# Patient Record
Sex: MALE | Race: WHITE | NOT HISPANIC OR LATINO | URBAN - METROPOLITAN AREA
[De-identification: names, ages, dates, MRNs, and addresses within clinical notes are randomized per-mention and may not be internally consistent; named-entity substitution may affect disease eponyms.]

---

## 2017-05-11 ENCOUNTER — HOSPITAL ENCOUNTER (OUTPATIENT)
Dept: RADIOLOGY | Facility: HOSPITAL | Age: 57
Discharge: HOME | End: 2017-05-11
Attending: ORTHOPAEDIC SURGERY
Payer: COMMERCIAL

## 2017-05-11 ENCOUNTER — ALLSCRIPTS OFFICE VISIT (OUTPATIENT)
Dept: OTHER | Facility: OTHER | Age: 57
End: 2017-05-11

## 2017-05-11 DIAGNOSIS — M25.562 PAIN IN LEFT KNEE: ICD-10-CM

## 2017-05-11 DIAGNOSIS — M17.11 PRIMARY OSTEOARTHRITIS OF RIGHT KNEE: ICD-10-CM

## 2017-05-11 DIAGNOSIS — M17.12 PRIMARY OSTEOARTHRITIS OF LEFT KNEE: ICD-10-CM

## 2017-05-11 DIAGNOSIS — M25.561 PAIN IN RIGHT KNEE: ICD-10-CM

## 2017-05-11 PROCEDURE — 73562 X-RAY EXAM OF KNEE 3: CPT

## 2017-06-05 ENCOUNTER — APPOINTMENT (OUTPATIENT)
Dept: PHYSICAL THERAPY | Facility: CLINIC | Age: 57
End: 2017-06-05
Payer: COMMERCIAL

## 2017-06-05 ENCOUNTER — GENERIC CONVERSION - ENCOUNTER (OUTPATIENT)
Dept: OTHER | Facility: OTHER | Age: 57
End: 2017-06-05

## 2017-06-05 PROCEDURE — 97110 THERAPEUTIC EXERCISES: CPT

## 2017-06-05 PROCEDURE — 97162 PT EVAL MOD COMPLEX 30 MIN: CPT

## 2017-06-19 ENCOUNTER — APPOINTMENT (OUTPATIENT)
Dept: PHYSICAL THERAPY | Facility: CLINIC | Age: 57
End: 2017-06-19
Payer: COMMERCIAL

## 2017-06-19 PROCEDURE — 97110 THERAPEUTIC EXERCISES: CPT

## 2017-06-19 PROCEDURE — 97140 MANUAL THERAPY 1/> REGIONS: CPT

## 2017-06-22 ENCOUNTER — ALLSCRIPTS OFFICE VISIT (OUTPATIENT)
Dept: OTHER | Facility: OTHER | Age: 57
End: 2017-06-22

## 2017-06-30 ENCOUNTER — ALLSCRIPTS OFFICE VISIT (OUTPATIENT)
Dept: OTHER | Facility: OTHER | Age: 57
End: 2017-06-30

## 2017-07-07 ENCOUNTER — ALLSCRIPTS OFFICE VISIT (OUTPATIENT)
Dept: OTHER | Facility: OTHER | Age: 57
End: 2017-07-07

## 2017-07-14 ENCOUNTER — ALLSCRIPTS OFFICE VISIT (OUTPATIENT)
Dept: OTHER | Facility: OTHER | Age: 57
End: 2017-07-14

## 2017-08-22 ENCOUNTER — GENERIC CONVERSION - ENCOUNTER (OUTPATIENT)
Dept: OTHER | Facility: OTHER | Age: 57
End: 2017-08-22

## 2017-11-16 ENCOUNTER — GENERIC CONVERSION - ENCOUNTER (OUTPATIENT)
Dept: OTHER | Facility: OTHER | Age: 57
End: 2017-11-16

## 2017-12-05 ENCOUNTER — GENERIC CONVERSION - ENCOUNTER (OUTPATIENT)
Dept: OTHER | Facility: OTHER | Age: 57
End: 2017-12-05

## 2017-12-12 ENCOUNTER — GENERIC CONVERSION - ENCOUNTER (OUTPATIENT)
Dept: OTHER | Facility: OTHER | Age: 57
End: 2017-12-12

## 2017-12-19 ENCOUNTER — GENERIC CONVERSION - ENCOUNTER (OUTPATIENT)
Dept: OTHER | Facility: OTHER | Age: 57
End: 2017-12-19

## 2018-01-12 VITALS
DIASTOLIC BLOOD PRESSURE: 90 MMHG | WEIGHT: 236 LBS | HEIGHT: 63 IN | HEART RATE: 71 BPM | SYSTOLIC BLOOD PRESSURE: 160 MMHG | BODY MASS INDEX: 41.82 KG/M2

## 2018-01-12 VITALS
SYSTOLIC BLOOD PRESSURE: 159 MMHG | HEART RATE: 74 BPM | WEIGHT: 236 LBS | DIASTOLIC BLOOD PRESSURE: 83 MMHG | BODY MASS INDEX: 41.81 KG/M2

## 2018-01-13 VITALS
BODY MASS INDEX: 41.81 KG/M2 | HEART RATE: 86 BPM | DIASTOLIC BLOOD PRESSURE: 94 MMHG | WEIGHT: 236 LBS | SYSTOLIC BLOOD PRESSURE: 159 MMHG

## 2018-01-15 VITALS
HEART RATE: 72 BPM | SYSTOLIC BLOOD PRESSURE: 172 MMHG | WEIGHT: 237 LBS | HEIGHT: 63 IN | DIASTOLIC BLOOD PRESSURE: 106 MMHG | BODY MASS INDEX: 41.99 KG/M2

## 2018-01-22 VITALS
SYSTOLIC BLOOD PRESSURE: 186 MMHG | BODY MASS INDEX: 40.93 KG/M2 | HEART RATE: 69 BPM | DIASTOLIC BLOOD PRESSURE: 103 MMHG | WEIGHT: 231 LBS | HEIGHT: 63 IN

## 2018-01-24 VITALS
HEART RATE: 74 BPM | DIASTOLIC BLOOD PRESSURE: 93 MMHG | BODY MASS INDEX: 40.4 KG/M2 | SYSTOLIC BLOOD PRESSURE: 188 MMHG | HEIGHT: 63 IN | WEIGHT: 228 LBS

## 2018-01-24 VITALS
HEIGHT: 63 IN | WEIGHT: 229 LBS | SYSTOLIC BLOOD PRESSURE: 176 MMHG | DIASTOLIC BLOOD PRESSURE: 95 MMHG | HEART RATE: 72 BPM | BODY MASS INDEX: 40.57 KG/M2

## 2018-01-24 VITALS
DIASTOLIC BLOOD PRESSURE: 83 MMHG | HEART RATE: 63 BPM | WEIGHT: 229.38 LBS | BODY MASS INDEX: 40.64 KG/M2 | SYSTOLIC BLOOD PRESSURE: 162 MMHG | HEIGHT: 63 IN

## 2023-11-13 ENCOUNTER — APPOINTMENT (OUTPATIENT)
Dept: RADIOLOGY | Age: 63
End: 2023-11-13
Payer: COMMERCIAL

## 2023-11-13 VITALS
HEIGHT: 63 IN | DIASTOLIC BLOOD PRESSURE: 85 MMHG | HEART RATE: 85 BPM | BODY MASS INDEX: 38.09 KG/M2 | SYSTOLIC BLOOD PRESSURE: 162 MMHG | WEIGHT: 215 LBS

## 2023-11-13 DIAGNOSIS — M17.11 PRIMARY OSTEOARTHRITIS OF RIGHT KNEE: ICD-10-CM

## 2023-11-13 DIAGNOSIS — M25.562 PAIN IN BOTH KNEES, UNSPECIFIED CHRONICITY: ICD-10-CM

## 2023-11-13 DIAGNOSIS — M25.561 PAIN IN BOTH KNEES, UNSPECIFIED CHRONICITY: ICD-10-CM

## 2023-11-13 DIAGNOSIS — M17.11 PRIMARY OSTEOARTHRITIS OF RIGHT KNEE: Primary | ICD-10-CM

## 2023-11-13 DIAGNOSIS — M17.12 PRIMARY OSTEOARTHRITIS OF LEFT KNEE: ICD-10-CM

## 2023-11-13 PROCEDURE — 73564 X-RAY EXAM KNEE 4 OR MORE: CPT

## 2023-11-13 PROCEDURE — 73562 X-RAY EXAM OF KNEE 3: CPT

## 2023-11-13 PROCEDURE — 99205 OFFICE O/P NEW HI 60 MIN: CPT | Performed by: STUDENT IN AN ORGANIZED HEALTH CARE EDUCATION/TRAINING PROGRAM

## 2023-11-13 RX ORDER — CELECOXIB 200 MG/1
200 CAPSULE ORAL 2 TIMES DAILY
Qty: 60 CAPSULE | Refills: 0 | Status: SHIPPED | OUTPATIENT
Start: 2023-11-13

## 2023-11-13 RX ORDER — CELECOXIB 200 MG/1
200 CAPSULE ORAL 2 TIMES DAILY
Qty: 60 CAPSULE | Refills: 1 | Status: SHIPPED | OUTPATIENT
Start: 2023-11-13 | End: 2023-11-13

## 2023-11-13 RX ORDER — ASCORBIC ACID 500 MG
500 TABLET ORAL 2 TIMES DAILY
Qty: 60 TABLET | Refills: 0 | Status: SHIPPED | OUTPATIENT
Start: 2023-11-13 | End: 2023-12-13

## 2023-11-13 RX ORDER — FOLIC ACID 1 MG/1
1 TABLET ORAL DAILY
Qty: 30 TABLET | Refills: 0 | Status: SHIPPED | OUTPATIENT
Start: 2023-11-13 | End: 2023-12-13

## 2023-11-13 RX ORDER — CELECOXIB 200 MG/1
200 CAPSULE ORAL 2 TIMES DAILY
Qty: 60 CAPSULE | Refills: 1 | Status: SHIPPED | OUTPATIENT
Start: 2023-11-13 | End: 2023-11-13 | Stop reason: SDUPTHER

## 2023-11-13 RX ORDER — HYDROCHLOROTHIAZIDE 25 MG/1
25 TABLET ORAL DAILY
COMMUNITY
Start: 2023-08-24

## 2023-11-13 RX ORDER — CHLORHEXIDINE GLUCONATE ORAL RINSE 1.2 MG/ML
15 SOLUTION DENTAL ONCE
OUTPATIENT
Start: 2023-11-13 | End: 2023-11-13

## 2023-11-13 RX ORDER — FERROUS SULFATE 324(65)MG
324 TABLET, DELAYED RELEASE (ENTERIC COATED) ORAL EVERY OTHER DAY
Qty: 15 TABLET | Refills: 0 | Status: SHIPPED | OUTPATIENT
Start: 2023-11-13 | End: 2023-12-13

## 2023-11-13 RX ORDER — CHLORHEXIDINE GLUCONATE 4 G/100ML
SOLUTION TOPICAL DAILY PRN
OUTPATIENT
Start: 2023-11-13

## 2023-11-13 RX ORDER — MULTIVIT-MIN/IRON FUM/FOLIC AC 7.5 MG-4
1 TABLET ORAL DAILY
Qty: 30 TABLET | Refills: 0 | Status: SHIPPED | OUTPATIENT
Start: 2023-11-13 | End: 2023-12-13

## 2023-11-13 RX ORDER — WARFARIN SODIUM 4 MG/1
TABLET ORAL
COMMUNITY

## 2023-11-13 RX ORDER — ACETAMINOPHEN 325 MG/1
975 TABLET ORAL ONCE
OUTPATIENT
Start: 2023-11-13 | End: 2023-11-13

## 2023-11-13 NOTE — ASSESSMENT & PLAN NOTE
Patient is a Middle-Aged (Approx 42-67yo) male with with pain at rest or at night  , functional instability, no mechanical symptoms  and addressed modifiable risk factors. Radiographic evaluation demonstrates severe degenerative changes in all compartments. Physical exam reveals fixed varus and full range of motion.  Given these findings, I recommend:     -Right total knee arthroplasty

## 2023-11-13 NOTE — TELEPHONE ENCOUNTER
Caller: Pharmacy     Doctor: Dg Mendoza     Reason for call: States there is an interaction with Celebrex and warfarin     Call back#:   [facility] DR JONO ZHOU [facility] [address] [facility], [address] [facility] [address]  [address] [facility], [address] [place] [address]  Phone: [phone]  Fax: [phone]  RUSSELL #: --

## 2023-11-13 NOTE — PROGRESS NOTES
Date: 23  Lynn Mercado. MRN# [id number]  : 1960      Chief Complaint: Bilateral knee pain, right greater than left    Assessment and Plan:    Primary osteoarthritis of right knee  Patient is a Middle-Aged (Approx 42-67yo) male with with pain at rest or at night  , functional instability, no mechanical symptoms  and addressed modifiable risk factors. Radiographic evaluation demonstrates severe degenerative changes in all compartments. Physical exam reveals fixed varus and full range of motion. Given these findings, I recommend:     -Right total knee arthroplasty    Primary osteoarthritis of left knee  While patient's left knee osteoarthritis is radiographically more advanced, the right side is more symptomatic. He would like to proceed with right-sided total knee arthroplasty at this time    -WBAT  -Activity modification to limit strain or impact on the joint  -celecoxib (Celebrex) as needed. Patient cautioned on the interaction with his warfarin  -Tylenol as needed. Do not exceed 3000mg daily  -Supervised physical therapy. Script provided   -Home exercise program directed by PT  -Weight loss discussed   -Knee sleeve or brace for comfort  -Patient will likely schedule total knee arthroplasty later in the year     TOTAL KNEE REPLACEMENT INDICATIONS AND RISKS    We had a lengthy discussion with the patient regarding the potential options for treatment. The patient has had an extensive conservative management course up until this time and therefore I do not feel that any additional conservative management will provide additional relief. The patient's symptoms have progressively worsened to the point where they now limit the patient's daily activities and quality of life. At this time, I feel that this patient would be an excellent candidate for a total knee replacement. The patient has failed non-operative care and continues to have unacceptable symptoms.  We discussed the treatment options and alternatives and the risks and benefits of surgery in great detail. While no guarantees can be made, total knee replacement has a very high success rate in terms of relieving a patient's knee pain and returning them to a more active, independent lifestyle for 10-15 years or more. All surgery carries some risk; for knee replacement, the complication rate is low but may include: death (very rare), infection, bleeding requiring transfusion, blood clots in legs traveling to lungs, nerve and/or blood vessel damage, bone fracture, persistent knee pain and/or stiffness, and repeat surgery(ies). The risk of a major complication is about 1-2 per 1000 cases. Total knee replacement should only be done if conservative treatment has failed. The revision rate for total knee replacements is about 1-2% per year; in other words, 85-95% of knee replacements may last 10 years; 75-85% last 20 years; and so on, assuming no injury to the knee. Finally we discussed anesthesia related complications which will be discussed in greater detail with the anesthesia team before surgery. The patient was shown total knee booklets, diagrams and/or models and all of their questions have been answered at the present time. The patient may call or come in if they have any other concerns or questions. The patient also understands the post-operative rehabilitation process and the need for their cooperation and participation, and that their results may be compromised by their lack of compliance. The patient would like to proceed. Patient is encouraged to seek additional opinions if they so desire. The patient voiced their understanding of the surgical plan and potential complications and wishes to proceed with surgery. Subjective:     Knee Pain  Patient complains of bilateral knee pain, right greater than left. This is evaluated as a personal injury. The pain began several years ago.  The pain is located global.  He describes the symptoms as aching, shooting, and throbbing. Symptoms improve with rest, avoiding painful activities. The symptoms are worse with deep knee bending, sleeping, sitting for prolonged periods of time. The knee has given out or felt unstable. The patient can bend and straighten the knee fully. Treatment to date has been ice, heat, Tylenol, NSAID's, knee sleeve/brace, cortisone injection, therapy, without significant relief. Of note, patient was seen in 2017 by Dr. Gm Cerrato who indicated the patient for total knee arthroplasty at that time. Patient wanted to defer surgical management until the knee pain no longer responded to less aggressive treatment modalities. External Records Reviewed: office notes and radiology reports    Allergy:  Allergies   Allergen Reactions    Other Myalgia     Medications:  all current active meds have been reviewed  Past Medical History:  Past Medical History:   Diagnosis Date    Hypertension      Past Surgical History:  Past Surgical History:   Procedure Laterality Date    FOOT SURGERY Right     KNEE SURGERY Right 2009    SHOULDER SURGERY       Family History:  Family History   Problem Relation Age of Onset    No Known Problems Mother     No Known Problems Father      Social History:  Social History     Substance and Sexual Activity   Alcohol Use None     Social History     Substance and Sexual Activity   Drug Use Not on file     Social History     Tobacco Use   Smoking Status Former    Types: Cigarettes    Quit date:     Years since quittin.8    Passive exposure: Never   Smokeless Tobacco Never           ROS:   Review of Systems   All other systems reviewed and are negative.        Objective:   BP Readings from Last 1 Encounters:   23 162/85      Wt Readings from Last 1 Encounters:   23 97.5 kg (215 lb)      Pulse Readings from Last 1 Encounters:   23 85      BMI: Estimated body mass index is 38.09 kg/m² as calculated from the following:    Height as of this encounter: 5' 3" (1.6 m). Weight as of this encounter: 97.5 kg (215 lb). Physical Exam  Constitutional:       General: He is not in acute distress. HENT:      Head: Normocephalic and atraumatic. Eyes:      Conjunctiva/sclera: Conjunctivae normal.   Cardiovascular:      Comments: Extremities well perfused   No LE edema    Pulmonary:      Effort: Pulmonary effort is normal.   Abdominal:      General: Abdomen is flat. Bowel sounds are normal.   Neurological:      Mental Status: He is alert. Mental status is at baseline. Gait and Station:   antalgic    Bilateral Lower Extremity:  Left Knee: Inspection:  normal color, temperature, turgor and moisture    Overall limb alignment: varus    Effusion: minimal    ROM 0 to 140 with pain    Extensor Lag: Absent    Palpation: Medial and Lateral joint line tenderness to palpation    stable to AP translation at 90 deg    Coronal plane stable in full extension    Coronal plane stable in mid-flexion     Motor: 5/5 EHL/FHL/TA/GS/Qd/Hs    Vascular: Toes WWP with BCR    Sensory: SILT DP/SP/Meli/Saph/Tib    Right knee: Inspection:  normal color, temperature, turgor and moisture    Overall limb alignment: varus    Effusion: minimal    ROM 0 to 140 with pain    Extensor Lag: Absent    Palpation: Medial and Lateral joint line tenderness to palpation    stable to AP translation at 90 deg    Coronal plane stable in full extension    Coronal plane stable in mid-flexion     Motor: 5/5 EHL/FHL/TA/GS/Qd/Hs    Vascular:  Toes WWP with BCR    Sensory: SILT DP/SP/Meli/Saph/Tib    Images:    I personally reviewed relevant images in the PACS system and my interpretation is as follows:  X-rays of the right knee reveal severe degenerative changes with subchondral sclerosis, joint space narrowing, and osteophyte formation  X-rays of the left knee reveal severe degenerative changes with subchondral cysts, subchondral sclerosis, joint space narrowing, and osteophyte formation        Lalita Jerez MD  Adult Reconstruction Specialist   [address] [facility]

## 2023-11-13 NOTE — ASSESSMENT & PLAN NOTE
While patient's left knee osteoarthritis is radiographically more advanced, the right side is more symptomatic. He would like to proceed with right-sided total knee arthroplasty at this time    -WBAT  -Activity modification to limit strain or impact on the joint  -celecoxib (Celebrex) as needed. Patient cautioned on the interaction with his warfarin  -Tylenol as needed. Do not exceed 3000mg daily  -Supervised physical therapy.  Script provided   -Home exercise program directed by PT  -Weight loss discussed   -Knee sleeve or brace for comfort  -Patient will likely schedule total knee arthroplasty later in the year

## 2023-11-16 ENCOUNTER — TELEPHONE (OUTPATIENT)
Dept: OBGYN CLINIC | Facility: CLINIC | Age: 63
End: 2023-11-16

## 2023-11-16 NOTE — TELEPHONE ENCOUNTER
Spoke with patient regarding changing date/location of upcoming surgery. Patient agreed to moving surgery to 1/10/24 at [address] [place] [address]. Appointments updated.

## 2023-11-30 ENCOUNTER — TELEPHONE (OUTPATIENT)
Dept: OBGYN CLINIC | Facility: CLINIC | Age: 63
End: 2023-11-30

## 2023-11-30 ENCOUNTER — TELEPHONE (OUTPATIENT)
Age: 63
End: 2023-11-30

## 2023-11-30 NOTE — TELEPHONE ENCOUNTER
Called and spoke with patient regarding changing upcoming surgery date. Moved it from 1/10/24 to 1/12/24 at [address] [place] [address]. Patient in agreement. Related appointments updated.

## 2023-12-05 ENCOUNTER — TELEPHONE (OUTPATIENT)
Dept: OBGYN CLINIC | Facility: HOSPITAL | Age: 63
End: 2023-12-05

## 2023-12-05 NOTE — TELEPHONE ENCOUNTER
Preoperative Elective Admission Assessment    Living Situation:    Who does pt live with: lives alone  What kind of home: apartment  How do they enter the home: front  How many levels in home: 1   # of steps to enter home: 4  # of steps to second floor: n/a  Are there handrails: Yes  Are there landings: No  Sleeping arrangement: first/entry floor  Where is Bathroom: entry level  Where is the tub or shower: Step in bath tub with grab bar, denies shower chair   Dogs or ther pets: n/a     First Floor Setup:   Is there a bathroom: Yes  Where would pt sleep: bed     DME: grab bars, rolling walker, and cane  We discussed clearing pathways in the home and making sure there is accessibly to use the walker, for example, removing throw rugs. Patient's Current Level of Function: Ambulates: Independently and ADLs: Independent    Post-op Caregiver: friend  Caregiver Name and phone number for Inpatient discharge needs: Estrella Hicks  Currently receive any HHC/aides/community supports: No     Post-op Transport: friend  To/from hospital: friend  To/from PT 2-3x/week: friend  Uses community transport now: No     Outpatient Physical Therapy Site:  Site: [facility] patient  pre and post-op appts scheduled? No     Medication Management: self and out of bottle  Preferred Pharmacy for Post-op Medications: [facility]  Blood Management Vitamin Regimen:  starting in 2 weeks  Post-op anticoagulant: to be determined by surgical team postoperatively     DC Plan: Pt plans to be discharged home      Barriers to DC identified preoperatively: none identified    BMI: 38.09    Patient Education:  Pt educated on post-op pain, early mobilization (POD0), LOS goals, OP PT goals, and preoperative bathing. Patient educated that our goal is to appropriately discharge patient based off their post-op function while striving to maintain maximal independence.  The goal is to discharge patient to home and for them to attend outpatient physical therapy.     Assigned to care team? Yes

## 2023-12-16 ENCOUNTER — APPOINTMENT (OUTPATIENT)
Dept: LAB | Facility: HOSPITAL | Age: 63
End: 2023-12-16
Payer: COMMERCIAL

## 2023-12-16 ENCOUNTER — LAB REQUISITION (OUTPATIENT)
Dept: LAB | Facility: HOSPITAL | Age: 63
End: 2023-12-16
Payer: COMMERCIAL

## 2023-12-16 DIAGNOSIS — Z01.818 ENCOUNTER FOR PREADMISSION TESTING: ICD-10-CM

## 2023-12-16 DIAGNOSIS — M17.11 PRIMARY OSTEOARTHRITIS OF RIGHT KNEE: ICD-10-CM

## 2023-12-16 DIAGNOSIS — Z01.818 ENCOUNTER FOR OTHER PREPROCEDURAL EXAMINATION: ICD-10-CM

## 2023-12-16 LAB
ABO GROUP BLD: NORMAL
ALBUMIN SERPL BCP-MCNC: 4 G/DL (ref 3.5–5)
ALP SERPL-CCNC: 55 U/L (ref 34–104)
ALT SERPL W P-5'-P-CCNC: 27 U/L (ref 7–52)
ANION GAP SERPL CALCULATED.3IONS-SCNC: 5 MMOL/L
APTT PPP: 34 SECONDS (ref 23–37)
AST SERPL W P-5'-P-CCNC: 24 U/L (ref 13–39)
BASOPHILS # BLD AUTO: 0.05 THOUSANDS/ÂΜL (ref 0–0.1)
BASOPHILS NFR BLD AUTO: 1 % (ref 0–1)
BILIRUB SERPL-MCNC: 0.96 MG/DL (ref 0.2–1)
BLD GP AB SCN SERPL QL: NEGATIVE
BUN SERPL-MCNC: 31 MG/DL (ref 5–25)
CALCIUM SERPL-MCNC: 9.8 MG/DL (ref 8.4–10.2)
CHLORIDE SERPL-SCNC: 102 MMOL/L (ref 96–108)
CO2 SERPL-SCNC: 31 MMOL/L (ref 21–32)
CREAT SERPL-MCNC: 1.04 MG/DL (ref 0.6–1.3)
EOSINOPHIL # BLD AUTO: 0.44 THOUSAND/ÂΜL (ref 0–0.61)
EOSINOPHIL NFR BLD AUTO: 5 % (ref 0–6)
ERYTHROCYTE [DISTWIDTH] IN BLOOD BY AUTOMATED COUNT: 14.7 % (ref 11.6–15.1)
EST. AVERAGE GLUCOSE BLD GHB EST-MCNC: 131 MG/DL
GFR SERPL CREATININE-BSD FRML MDRD: 76 ML/MIN/1.73SQ M
GLUCOSE SERPL-MCNC: 100 MG/DL (ref 65–140)
HBA1C MFR BLD: 6.2 %
HCT VFR BLD AUTO: 46 % (ref 36.5–49.3)
HGB BLD-MCNC: 14.7 G/DL (ref 12–17)
IMM GRANULOCYTES # BLD AUTO: 0.03 THOUSAND/UL (ref 0–0.2)
IMM GRANULOCYTES NFR BLD AUTO: 0 % (ref 0–2)
INR PPP: 2.61 (ref 0.84–1.19)
LYMPHOCYTES # BLD AUTO: 2.56 THOUSANDS/ÂΜL (ref 0.6–4.47)
LYMPHOCYTES NFR BLD AUTO: 29 % (ref 14–44)
MCH RBC QN AUTO: 28.3 PG (ref 26.8–34.3)
MCHC RBC AUTO-ENTMCNC: 32 G/DL (ref 31.4–37.4)
MCV RBC AUTO: 89 FL (ref 82–98)
MONOCYTES # BLD AUTO: 0.74 THOUSAND/ÂΜL (ref 0.17–1.22)
MONOCYTES NFR BLD AUTO: 8 % (ref 4–12)
NEUTROPHILS # BLD AUTO: 5.05 THOUSANDS/ÂΜL (ref 1.85–7.62)
NEUTS SEG NFR BLD AUTO: 57 % (ref 43–75)
NRBC BLD AUTO-RTO: 0 /100 WBCS
PLATELET # BLD AUTO: 215 THOUSANDS/UL (ref 149–390)
PMV BLD AUTO: 11.6 FL (ref 8.9–12.7)
POTASSIUM SERPL-SCNC: 4.5 MMOL/L (ref 3.5–5.3)
PROT SERPL-MCNC: 7.2 G/DL (ref 6.4–8.4)
PROTHROMBIN TIME: 28.4 SECONDS (ref 11.6–14.5)
RBC # BLD AUTO: 5.19 MILLION/UL (ref 3.88–5.62)
RH BLD: POSITIVE
SODIUM SERPL-SCNC: 138 MMOL/L (ref 135–147)
SPECIMEN EXPIRATION DATE: NORMAL
WBC # BLD AUTO: 8.87 THOUSAND/UL (ref 4.31–10.16)

## 2023-12-16 PROCEDURE — 86850 RBC ANTIBODY SCREEN: CPT | Performed by: STUDENT IN AN ORGANIZED HEALTH CARE EDUCATION/TRAINING PROGRAM

## 2023-12-16 PROCEDURE — 85730 THROMBOPLASTIN TIME PARTIAL: CPT

## 2023-12-16 PROCEDURE — 85610 PROTHROMBIN TIME: CPT

## 2023-12-16 PROCEDURE — 36415 COLL VENOUS BLD VENIPUNCTURE: CPT

## 2023-12-16 PROCEDURE — 83036 HEMOGLOBIN GLYCOSYLATED A1C: CPT

## 2023-12-16 PROCEDURE — 80053 COMPREHEN METABOLIC PANEL: CPT

## 2023-12-16 PROCEDURE — 86900 BLOOD TYPING SEROLOGIC ABO: CPT | Performed by: STUDENT IN AN ORGANIZED HEALTH CARE EDUCATION/TRAINING PROGRAM

## 2023-12-16 PROCEDURE — 85025 COMPLETE CBC W/AUTO DIFF WBC: CPT

## 2023-12-16 PROCEDURE — 86901 BLOOD TYPING SEROLOGIC RH(D): CPT | Performed by: STUDENT IN AN ORGANIZED HEALTH CARE EDUCATION/TRAINING PROGRAM

## 2023-12-19 ENCOUNTER — TELEPHONE (OUTPATIENT)
Age: 63
End: 2023-12-19

## 2023-12-19 ENCOUNTER — TELEPHONE (OUTPATIENT)
Dept: OBGYN CLINIC | Facility: MEDICAL CENTER | Age: 63
End: 2023-12-19

## 2023-12-19 NOTE — TELEPHONE ENCOUNTER
Caller: Tru     Doctor: Dr Wood     Reason for call: The patient calling to update :    Workers Comp information per the patient  Name :   [employer]    [address]   [place] [address]     Policy # [id number]  Account Number : [id number]    Justin ([phone])     Call back#: [phone]

## 2023-12-19 NOTE — TELEPHONE ENCOUNTER
Caller: Patient    Doctor: Nina    Reason for call: Patient calling back with DOI for WC Claim.  Added information from previous message to the appropriate screens in the billing tab under WC.  Patient will be calling back with the  Ins carrier address and phone number.      Call back#: n/a

## 2023-12-19 NOTE — TELEPHONE ENCOUNTER
Caller: Patient    Doctor: Nina    Reason for call: patient states their case is going to be going through work comp-including upcoming surgery.    Work Comp information:  [facility] [employer]  Claim# [id number]  -[employer] ([phone])  States there is not a DOI due to it happening over the time of being at the employment. Patient is going to be calling back with some dates that their employer gave work comp.    Please send info to surg .    Call back#: [phone]

## 2023-12-20 ENCOUNTER — ANESTHESIA EVENT (OUTPATIENT)
Dept: PERIOP | Facility: HOSPITAL | Age: 63
End: 2023-12-20
Payer: OTHER MISCELLANEOUS

## 2023-12-20 NOTE — PRE-PROCEDURE INSTRUCTIONS
Pre-Surgery Instructions:   Medication Instructions    ascorbic acid (VITAMIN C) 500 MG tablet Hold day of surgery.    celecoxib (CeleBREX) 200 mg capsule Stop taking 3 days prior to surgery.    ferrous sulfate 324 (65 Fe) mg Hold day of surgery.    folic acid (FOLVITE) 1 mg tablet Hold day of surgery.    hydrochlorothiazide (HYDRODIURIL) 25 mg tablet Hold day of surgery.    Multiple Vitamins-Minerals (multivitamin with minerals) tablet Hold day of surgery.    warfarin (COUMADIN) 4 mg tablet Instructions provided by MD   Medication instructions for day surgery reviewed. Please use only a sip of water to take your instructed medications. Avoid all over the counter vitamins, supplements and NSAIDS for one week prior to surgery per anesthesia guidelines. Tylenol is ok to take as needed. Pt has CC on 12/22 and will ask cards for coumadin hold instructions.      You will receive a call one business day prior to surgery with an arrival time and hospital directions. If your surgery is scheduled on a Monday, the hospital will be calling you on the Friday prior to your surgery. If you have not heard from anyone by 8pm, please call the hospital supervisor through the hospital  at [phone]. (Aleksandr [phone]).    Do not eat or drink anything after midnight the night before your surgery, including candy, mints, lifesavers, or chewing gum. Do not drink alcohol 24hrs before your surgery. Try not to smoke at least 24hrs before your surgery.       Follow the pre surgery showering instructions as listed in the “My Surgical Experience Booklet” or otherwise provided by your surgeon's office. Do not use a blade to shave the surgical area 1 week before surgery. It is okay to use a clean electric clippers up to 24 hours before surgery. Do not apply any lotions, creams, including makeup, cologne, deodorant, or perfumes after showering on the day of your surgery. Do not use dry shampoo, hair spray, hair gel, or any type of  hair products.     No contact lenses, eye make-up, or artificial eyelashes. Remove nail polish, including gel polish, and any artificial, gel, or acrylic nails if possible. Remove all jewelry including rings and body piercing jewelry.     Wear causal clothing that is easy to take on and off. Consider your type of surgery.    Keep any valuables, jewelry, piercings at home. Please bring any specially ordered equipment (sling, braces) if indicated.    Arrange for a responsible person to drive you to and from the hospital on the day of your surgery. Visitor Guidelines discussed.     Call the surgeon's office with any new illnesses, exposures, or additional questions prior to surgery.    Please reference your “My Surgical Experience Booklet” for additional information to prepare for your upcoming surgery.

## 2023-12-26 PROBLEM — R73.03 PREDIABETES: Status: ACTIVE | Noted: 2021-12-13

## 2023-12-26 NOTE — H&P (VIEW-ONLY)
Internal Medicine Pre-Operative Evaluation:     Reason for Visit: Pre-operative Evaluation for Risk Stratification and Optimization    Patient ID: Tru Sy Jr. is a 63 y.o. male.     Surgery: Arthroplasty of right knee  Referring Provider: Dr. Wood      Recommendations to Proceed withSurgery    Patient is considered to be Low risk for Medium risk procedure.     After evaluation and discussion with patient with emphasis that all surgery has some degree of inherent risk it is determined this procedure is of acceptable risk  medically.    Patient may proceed with planned procedure with the specified coumadin instructions by his cardiologist. I spoke directly with Lynnette at his coumadin office and she will place the order for the post operative lovenox x 5 days to his local pharmacy and she will also instruct him when to have his INR checked post operatively.      Assessment    Pre-operative Medical Evaluation for planned surgery  Recommendations as listed in PLAN section below  Contact surgical nurse  navigator with any questions regarding preoperative plan or schedule.      Problem List Items Addressed This Visit          Cardiovascular and Mediastinum    Benign essential hypertension (Chronic)     Stable  Cont current medications as directed  Monitor post operative BP   Hold hctz the morning of surgery             Pulmonary embolism (HCC) (Chronic)     Takes warfarin for same  Had PE on 2 separate occasions at first with diagnosis  Then he attempted to come off AC and had a repeat            Musculoskeletal and Integument    Primary osteoarthritis of right knee     Failed outpatient conservative measures  Electing to undergo arthroplasty              Hematopoietic and Hemostatic    Factor V Leiden (HCC) (Chronic)     With h/o PE/DVT  Cardiology manages coumadin  They recommend holding coumadin x 2 days preoperatively and resuming POD#0 with bridge with low molecular weight heparin or lovenox until  INR is above 2  Would recommend a 5 day prescription for lovenox post operatively  **It should be noted that he has had surgeries in the past and has not required bridging post operatively and has not had issues.   Pt is agreeable, he will recheck his INR POD #3  Pt takes coumadin 7.5mg daily and has remained stable on this current dose  He was cleared by cardiology 12/22/23            Other    Obesity (Chronic)     Recommend ongoing attempts at weight loss  Current BMI meets criteria of <40 per MLJ preoperative qualifications           Prediabetes     Hgb A1c 6.2  Recommend following DM diet  Monitor FBS            Other Visit Diagnoses       Preoperative clearance    -  Primary                 Plan:     1. Further preoperative workup as follows:   - none no further testing required may proceed with surgery    2. Medication Management/Recommendations:   - hold ACE / ARB morning of surgery unless given other instructions by your provider  - hold aspirin 7 days prior to surgery  - Warfarin management: Discontinue warfarin 2 days before surgery and resume POD#0  - avoid use of NSAID such as motrin, advil, aleve for 7 days prior to surgery  - hold all OTC herbal or nutritional supplements 7 days before surgery    3. Patient requires further consultation with:   No Consults Required    4. Discharge Planning / Barriers to Discharge  none identified - patients has post discharge therapy plan in place, transportation arranged for discharge day, adequate family support at home to assist with discharge to home.        Subjective:           History of Present Illness:     Tru Sy JrDarell is a 63 y.o. male who presents to the office today for a preoperative consultation at the request of surgeon. The patient understands this is an elective procedure and not emergent. They are electing to undergo planned procedure with an understanding that all surgery has inherent risk. They have worked with their surgeon and failed  "conservative treatment measures. Today they present for preoperative risk assessment and recommendations for optimization in preparation for surgery.    Pt seen in surgical optimization center for upcoming proposed surgery. They have failed previous conservative measures and have elected surgical intervention.     Pt meets presurgical lab and BMI optimization goals.    Upon interview questioning patient is able to perform greater than 4 METs workload in daily life without any reported cardio-pulmonary symptoms. Pt was cleared by cardiology 12/22/2023 by Dr. Ornelas. He does recommend that the patient hold his coumadin for 2 days and bridge post surgery with Sq lovenox until INR is >2.0. I did discuss this with the patient and he is agreeable. We decided a 5 day prescription would be ideal. He will go get his INR checked on POD#3. It should be noted however that he has had to stop his coumadin in the past for other surgeries and has not required bridging and has done well.         ROS: No TIA's or unusual headaches, no dysphagia.  No prolonged cough. No dyspnea or chest pain on exertion.  No abdominal pain, change in bowel habits, black or bloody stools.  No urinary tract or BPH symptoms.  Positive reported pain in arthritic joint. Positive difficulty with gait. No skin rashes or issues.      Objective:    /80   Ht 5' 3\" (1.6 m)   Wt 99.9 kg (220 lb 4.8 oz)   BMI 39.02 kg/m²       General Appearance: no distress, conversive  HEENT: PERRLA, conjuctiva normal; oropharynx clear; mucous membranes moist;   Neck:  Supple, no lymphadenopathy or thyromegaly  Lungs: breath sounds normal, normal respiratory effort, no retractions, expiratory effort normal  CV: normal heart sounds S1/S2, PMI normal   ABD: soft non tender, +obese +BS x4  EXT: DP pulses intact, no lymphadenopathy, no edema  Skin: normal turgor, normal texture, no rash  Psych: affect normal, mood normal  Neuro: AAOx3        The following portions of the " "patient's history were reviewed and updated as appropriate: allergies, current medications, past family history, past medical history, past social history, past surgical history and problem list.     Past History:       Past Medical History:   Diagnosis Date    Factor 5 Leiden mutation, heterozygous (HCC)     H/O blood clots     pulmonary embolism    Hypertension     PONV (postoperative nausea and vomiting)     Past Surgical History:   Procedure Laterality Date    FOOT SURGERY Right     KNEE SURGERY Right 2009    SHOULDER SURGERY Bilateral           Social History     Tobacco Use    Smoking status: Former     Current packs/day: 0.00     Types: Cigarettes     Quit date:      Years since quittin.0     Passive exposure: Never    Smokeless tobacco: Never   Vaping Use    Vaping status: Never Used   Substance Use Topics    Alcohol use: Not Currently    Drug use: Never     Family History   Problem Relation Age of Onset    No Known Problems Mother     No Known Problems Father           Allergies:     Allergies   Allergen Reactions    Statins Myalgia    Other Myalgia        Current Medications:     Current Outpatient Medications   Medication Instructions    ascorbic acid (VITAMIN C) 500 mg, Oral, 2 times daily    celecoxib (CELEBREX) 200 mg, Oral, 2 times daily    ferrous sulfate 324 mg, Oral, Every other day    folic acid (FOLVITE) 1 mg, Oral, Daily    hydrochlorothiazide (HYDRODIURIL) 25 mg, Oral, Daily    Multiple Vitamins-Minerals (multivitamin with minerals) tablet 1 tablet, Oral, Daily    warfarin (COUMADIN) 4 mg tablet Oral, 7.5mg 3xweek, 5mg 4xweek           PRE-OP WORKSHEET DATA    Assessment of Pre-Operative Risks     MLJ Quality Hard Stops:  BMI (<40) : Estimated body mass index is 39.02 kg/m² as calculated from the following:    Height as of this encounter: 5' 3\" (1.6 m).    Weight as of this encounter: 99.9 kg (220 lb 4.8 oz).    Hgb ( >11):   Lab Results   Component Value Date    HGB 14.7 2023 "     HbA1c (<7.5) :   Lab Results   Component Value Date    HGBA1C 6.2 (H) 12/16/2023     GFR (>60) (Less then 45 = Nephrology consult):  CrCl cannot be calculated (Patient's most recent lab result is older than the maximum 7 days allowed.).      Active Decompensated Chronic Conditions which would delay surgery  No acutely decompensated medical issues such as recent CVA, MI, new onset arrhythmia, severe aortic stenosis, CHF, uncontrolled COPD       Exercise Capacity: (if less the 4 mets consider functional assessment via cardiac stress testing or consultation)    Able to walk 2 blocks without symptoms?: Yes  Able to walk 1 flights without symptoms?: Yes    Assessment of intra and post operative respiratory, hemodynamic and thrombotic risks     Prior Anesthesia Reactions: No     Personal history of venous thromboembolic disease? Yes, has factor V    History of steroid use > 5 mg for >2 weeks within last year? No      The patient's risk factors for cardiac complications include :  none    Tru Sy JrDarell has an IN HOSPITAL cardiac risk of RCI RISK CLASS I (0 risk factors, risk of major cardiac compl. appr. 0.5%) based on RCRI calculator    Cardiac Risk Estimation: per the Revised Cardiac Risk Index (Circ. 100:1043, 1999),        Pre-Op Data Reviewed:       Laboratory Results: I have personally reviewed the pertinent laboratory results/reports     EKG:I have personally reviewed pertinent reports.  . I personally reviewed and interpreted available tracings in the electronic medical record    POCT=12/22/23 NSR w/RBBB and PAC's    OLD RECORDS: reviewed old records in the chart review section if EHR on day of visit.    Previous cardiopulmonary studies within the past year:  Echocardiogram: no  Cardiac Catheterization: no  Stress Test: no      Time of visit including pre-visit chart review, visit and post-visit coordination of plan and care , review of pre-surgical lab work, preparation and time spent documenting note in  electronic medical record, time spent face-to-face in physical examination answering patient questions by care team 45 minutes             [facility]

## 2023-12-26 NOTE — ASSESSMENT & PLAN NOTE
Stable  Cont current medications as directed  Monitor post operative BP   Hold hctz the morning of surgery

## 2023-12-26 NOTE — PATIENT INSTRUCTIONS
Contact surgical nurse  navigator with any questions regarding preoperative plan or schedule.  Stop all over the counter supplements, herbal, naturopathic  medications for 1 week prior to surgery UNLESS prescribed by your surgeon  Hold NSAIDS (i.e. advil, alleve, motrin, ibuprofen, celebrex) minimum 7 days prior to surgery  Hold Asprin minimum 7 days prior to surgery  Recommend using Tylenol ( acetaminophen ) 500mg every eight hours during the first week post discharge in conjunction with any additional pain medicine prescribed by your surgeon  Use bowel medicines prescribed by your surgeon ( colace) daily post op during the first 1-2 weeks to avoid post operative constipation issues  Call [phone] with any post discharge concerns or medical issues  The morning of surgery take only the following medication with small sip of water: none  Hold hctz the morning of surgery  Hold warfarin x2 days a/t cardiology and bridge back to therapeutic INR with lovenox until INR is therapeutic

## 2023-12-26 NOTE — ASSESSMENT & PLAN NOTE
With h/o PE/DVT  Cardiology manages coumadin  They recommend holding coumadin x 2 days preoperatively and resuming POD#0 with bridge with low molecular weight heparin or lovenox until INR is above 2  Pt takes coumadin 7.5mg daily and has remained stable on this current dose  He was cleared by cardiology 12/22/23

## 2023-12-27 ENCOUNTER — TELEPHONE (OUTPATIENT)
Age: 63
End: 2023-12-27

## 2023-12-27 NOTE — TELEPHONE ENCOUNTER
Caller: [facility] Lynnette     Doctor: Jose    Reason for call: Patient told them that he only needs top hold his coumadin for 2 days and then he will bridging that with the Lovenox. Patient also told them that he would be released to go home after procedure and he would not be going to a rehab facility. They wanted to make sure that this was correct and that the patient will receive the lovenox rx from us before he leaved.     Call back#: [phone]

## 2023-12-28 ENCOUNTER — OFFICE VISIT (OUTPATIENT)
Age: 63
End: 2023-12-28
Payer: OTHER MISCELLANEOUS

## 2023-12-28 ENCOUNTER — TELEPHONE (OUTPATIENT)
Dept: OBGYN CLINIC | Facility: CLINIC | Age: 63
End: 2023-12-28

## 2023-12-28 VITALS
DIASTOLIC BLOOD PRESSURE: 80 MMHG | WEIGHT: 220.3 LBS | SYSTOLIC BLOOD PRESSURE: 148 MMHG | BODY MASS INDEX: 39.04 KG/M2 | HEIGHT: 63 IN

## 2023-12-28 DIAGNOSIS — Z01.818 PREOPERATIVE CLEARANCE: Primary | ICD-10-CM

## 2023-12-28 DIAGNOSIS — I26.99 PULMONARY EMBOLISM, UNSPECIFIED CHRONICITY, UNSPECIFIED PULMONARY EMBOLISM TYPE, UNSPECIFIED WHETHER ACUTE COR PULMONALE PRESENT (HCC): Chronic | ICD-10-CM

## 2023-12-28 DIAGNOSIS — D68.51 FACTOR V LEIDEN (HCC): Chronic | ICD-10-CM

## 2023-12-28 DIAGNOSIS — R73.03 PREDIABETES: ICD-10-CM

## 2023-12-28 DIAGNOSIS — I10 BENIGN ESSENTIAL HYPERTENSION: Chronic | ICD-10-CM

## 2023-12-28 DIAGNOSIS — E66.09 CLASS 2 OBESITY DUE TO EXCESS CALORIES WITHOUT SERIOUS COMORBIDITY WITH BODY MASS INDEX (BMI) OF 38.0 TO 38.9 IN ADULT: Chronic | ICD-10-CM

## 2023-12-28 DIAGNOSIS — M17.11 PRIMARY OSTEOARTHRITIS OF RIGHT KNEE: ICD-10-CM

## 2023-12-28 PROCEDURE — 99205 OFFICE O/P NEW HI 60 MIN: CPT | Performed by: NURSE PRACTITIONER

## 2023-12-28 NOTE — TELEPHONE ENCOUNTER
Spoke with patient and rescheduled surgery to 1/15/24 at [facility], patient is coming in to see you today for PeriOP at 11am. Please address [facility] question regarding blood thinners (see attached).  Thank you

## 2023-12-28 NOTE — TELEPHONE ENCOUNTER
Called and spoke with patient regarding changing surgery date and location for R TKA. Agreed upon 1/15/24 at [facility]. Related appointments updated.

## 2024-01-12 ENCOUNTER — ANESTHESIA (OUTPATIENT)
Dept: PERIOP | Facility: HOSPITAL | Age: 64
End: 2024-01-12
Payer: OTHER MISCELLANEOUS

## 2024-01-14 RX ORDER — CELECOXIB 200 MG/1
200 CAPSULE ORAL 2 TIMES DAILY
Qty: 60 CAPSULE | Refills: 0 | Status: SHIPPED | OUTPATIENT
Start: 2024-01-14 | End: 2024-01-15

## 2024-01-14 RX ORDER — OXYCODONE HYDROCHLORIDE 5 MG/1
5 TABLET ORAL EVERY 4 HOURS PRN
Qty: 30 TABLET | Refills: 0 | Status: SHIPPED | OUTPATIENT
Start: 2024-01-14 | End: 2024-01-15

## 2024-01-14 RX ORDER — ACETAMINOPHEN 500 MG
1000 TABLET ORAL EVERY 8 HOURS PRN
Qty: 84 TABLET | Refills: 0 | Status: SHIPPED | OUTPATIENT
Start: 2024-01-14 | End: 2024-01-15

## 2024-01-15 ENCOUNTER — APPOINTMENT (OUTPATIENT)
Dept: RADIOLOGY | Facility: HOSPITAL | Age: 64
End: 2024-01-15
Payer: OTHER MISCELLANEOUS

## 2024-01-15 ENCOUNTER — HOSPITAL ENCOUNTER (OUTPATIENT)
Facility: HOSPITAL | Age: 64
Setting detail: OUTPATIENT SURGERY
Discharge: HOME | End: 2024-01-15
Attending: STUDENT IN AN ORGANIZED HEALTH CARE EDUCATION/TRAINING PROGRAM | Admitting: STUDENT IN AN ORGANIZED HEALTH CARE EDUCATION/TRAINING PROGRAM
Payer: OTHER MISCELLANEOUS

## 2024-01-15 VITALS
OXYGEN SATURATION: 95 % | WEIGHT: 219.36 LBS | SYSTOLIC BLOOD PRESSURE: 169 MMHG | HEART RATE: 84 BPM | DIASTOLIC BLOOD PRESSURE: 81 MMHG | RESPIRATION RATE: 16 BRPM | TEMPERATURE: 98.2 F | BODY MASS INDEX: 38.86 KG/M2

## 2024-01-15 DIAGNOSIS — M17.11 PRIMARY OSTEOARTHRITIS OF RIGHT KNEE: Primary | ICD-10-CM

## 2024-01-15 PROBLEM — R11.2 PONV (POSTOPERATIVE NAUSEA AND VOMITING): Status: ACTIVE | Noted: 2024-01-15

## 2024-01-15 PROBLEM — Z86.718 H/O BLOOD CLOTS: Status: ACTIVE | Noted: 2024-01-15

## 2024-01-15 PROBLEM — Z98.890 PONV (POSTOPERATIVE NAUSEA AND VOMITING): Status: ACTIVE | Noted: 2024-01-15

## 2024-01-15 LAB
ABO GROUP BLD: NORMAL
APTT PPP: 31 SECONDS (ref 23–37)
BLD GP AB SCN SERPL QL: NEGATIVE
INR PPP: 1.39 (ref 0.84–1.19)
PROTHROMBIN TIME: 17.3 SECONDS (ref 11.6–14.5)
RH BLD: POSITIVE
SPECIMEN EXPIRATION DATE: NORMAL

## 2024-01-15 PROCEDURE — 85730 THROMBOPLASTIN TIME PARTIAL: CPT | Performed by: NURSE PRACTITIONER

## 2024-01-15 PROCEDURE — C9290 INJ, BUPIVACAINE LIPOSOME: HCPCS | Performed by: ANESTHESIOLOGY

## 2024-01-15 PROCEDURE — 27447 TOTAL KNEE ARTHROPLASTY: CPT | Performed by: STUDENT IN AN ORGANIZED HEALTH CARE EDUCATION/TRAINING PROGRAM

## 2024-01-15 PROCEDURE — C1776 JOINT DEVICE (IMPLANTABLE): HCPCS | Performed by: STUDENT IN AN ORGANIZED HEALTH CARE EDUCATION/TRAINING PROGRAM

## 2024-01-15 PROCEDURE — C1713 ANCHOR/SCREW BN/BN,TIS/BN: HCPCS | Performed by: STUDENT IN AN ORGANIZED HEALTH CARE EDUCATION/TRAINING PROGRAM

## 2024-01-15 PROCEDURE — 86900 BLOOD TYPING SEROLOGIC ABO: CPT | Performed by: STUDENT IN AN ORGANIZED HEALTH CARE EDUCATION/TRAINING PROGRAM

## 2024-01-15 PROCEDURE — 27447 TOTAL KNEE ARTHROPLASTY: CPT | Performed by: PHYSICIAN ASSISTANT

## 2024-01-15 PROCEDURE — 73560 X-RAY EXAM OF KNEE 1 OR 2: CPT

## 2024-01-15 PROCEDURE — 86901 BLOOD TYPING SEROLOGIC RH(D): CPT | Performed by: STUDENT IN AN ORGANIZED HEALTH CARE EDUCATION/TRAINING PROGRAM

## 2024-01-15 PROCEDURE — 97163 PT EVAL HIGH COMPLEX 45 MIN: CPT | Performed by: PHYSICAL THERAPIST

## 2024-01-15 PROCEDURE — 86850 RBC ANTIBODY SCREEN: CPT | Performed by: STUDENT IN AN ORGANIZED HEALTH CARE EDUCATION/TRAINING PROGRAM

## 2024-01-15 PROCEDURE — 85610 PROTHROMBIN TIME: CPT | Performed by: NURSE PRACTITIONER

## 2024-01-15 PROCEDURE — 97166 OT EVAL MOD COMPLEX 45 MIN: CPT

## 2024-01-15 DEVICE — FIXED TIBIAL TRAY CEMENTED RIGHT, SIZE 3
Type: IMPLANTABLE DEVICE | Site: KNEE | Status: FUNCTIONAL
Brand: GMK PRIMARY TOTAL KNEE SYSTEM

## 2024-01-15 DEVICE — TIBIAL INSERT FIXED SPHERE FLEX   #3/12 MM R E-CROSS
Type: IMPLANTABLE DEVICE | Site: KNEE | Status: FUNCTIONAL
Brand: GMK SPHERE TOTAL KNEE SYSTEM

## 2024-01-15 DEVICE — SCREWS PACK
Type: IMPLANTABLE DEVICE | Site: KNEE | Status: FUNCTIONAL
Brand: KNEE INSTRUMENTS

## 2024-01-15 DEVICE — SWORD PIN PACK
Type: IMPLANTABLE DEVICE | Site: KNEE | Status: FUNCTIONAL
Brand: KNEE INSTRUMENTS

## 2024-01-15 DEVICE — FEMORAL COMPONENT SPHERIKA CEMENTED S2R
Type: IMPLANTABLE DEVICE | Site: KNEE | Status: FUNCTIONAL
Brand: GMK TOTAL KNEE SYSTEM

## 2024-01-15 DEVICE — SMARTSET HIGH PERFORMANCE MV MEDIUM VISCOSITY BONE CEMENT 40G
Type: IMPLANTABLE DEVICE | Site: KNEE | Status: FUNCTIONAL
Brand: SMARTSET

## 2024-01-15 RX ORDER — MEPERIDINE HYDROCHLORIDE 25 MG/ML
12.5 INJECTION INTRAMUSCULAR; INTRAVENOUS; SUBCUTANEOUS ONCE AS NEEDED
Status: DISCONTINUED | OUTPATIENT
Start: 2024-01-15 | End: 2024-01-15 | Stop reason: HOSPADM

## 2024-01-15 RX ORDER — ONDANSETRON 2 MG/ML
4 INJECTION INTRAMUSCULAR; INTRAVENOUS ONCE AS NEEDED
Status: DISCONTINUED | OUTPATIENT
Start: 2024-01-15 | End: 2024-01-15 | Stop reason: HOSPADM

## 2024-01-15 RX ORDER — CEFAZOLIN SODIUM 2 G/50ML
2000 SOLUTION INTRAVENOUS ONCE
Status: DISCONTINUED | OUTPATIENT
Start: 2024-01-15 | End: 2024-01-15 | Stop reason: HOSPADM

## 2024-01-15 RX ORDER — CELECOXIB 200 MG/1
200 CAPSULE ORAL 2 TIMES DAILY
Qty: 60 CAPSULE | Refills: 0 | Status: SHIPPED | OUTPATIENT
Start: 2024-01-15 | End: 2024-02-14

## 2024-01-15 RX ORDER — ROPIVACAINE HYDROCHLORIDE 5 MG/ML
INJECTION, SOLUTION EPIDURAL; INFILTRATION; PERINEURAL AS NEEDED
Status: DISCONTINUED | OUTPATIENT
Start: 2024-01-15 | End: 2024-01-15 | Stop reason: HOSPADM

## 2024-01-15 RX ORDER — PROPOFOL 10 MG/ML
INJECTION, EMULSION INTRAVENOUS CONTINUOUS PRN
Status: DISCONTINUED | OUTPATIENT
Start: 2024-01-15 | End: 2024-01-15

## 2024-01-15 RX ORDER — BUPIVACAINE HYDROCHLORIDE 7.5 MG/ML
INJECTION, SOLUTION INTRASPINAL AS NEEDED
Status: DISCONTINUED | OUTPATIENT
Start: 2024-01-15 | End: 2024-01-15

## 2024-01-15 RX ORDER — BUPIVACAINE HYDROCHLORIDE 5 MG/ML
INJECTION, SOLUTION EPIDURAL; INTRACAUDAL
Status: COMPLETED | OUTPATIENT
Start: 2024-01-15 | End: 2024-01-15

## 2024-01-15 RX ORDER — PROMETHAZINE HYDROCHLORIDE 25 MG/ML
6.25 INJECTION, SOLUTION INTRAMUSCULAR; INTRAVENOUS ONCE AS NEEDED
Status: DISCONTINUED | OUTPATIENT
Start: 2024-01-15 | End: 2024-01-15 | Stop reason: HOSPADM

## 2024-01-15 RX ORDER — CHLORHEXIDINE GLUCONATE ORAL RINSE 1.2 MG/ML
15 SOLUTION DENTAL ONCE
Status: COMPLETED | OUTPATIENT
Start: 2024-01-15 | End: 2024-01-15

## 2024-01-15 RX ORDER — MORPHINE SULFATE 10 MG/ML
2 INJECTION, SOLUTION INTRAMUSCULAR; INTRAVENOUS EVERY 2 HOUR PRN
Status: DISCONTINUED | OUTPATIENT
Start: 2024-01-15 | End: 2024-01-15 | Stop reason: HOSPADM

## 2024-01-15 RX ORDER — MAGNESIUM HYDROXIDE 1200 MG/15ML
LIQUID ORAL AS NEEDED
Status: DISCONTINUED | OUTPATIENT
Start: 2024-01-15 | End: 2024-01-15 | Stop reason: HOSPADM

## 2024-01-15 RX ORDER — DOCUSATE SODIUM 100 MG/1
100 CAPSULE, LIQUID FILLED ORAL 2 TIMES DAILY
Qty: 60 CAPSULE | Refills: 0 | Status: SHIPPED | OUTPATIENT
Start: 2024-01-15 | End: 2024-02-14

## 2024-01-15 RX ORDER — MIDAZOLAM HYDROCHLORIDE 2 MG/2ML
INJECTION, SOLUTION INTRAMUSCULAR; INTRAVENOUS AS NEEDED
Status: DISCONTINUED | OUTPATIENT
Start: 2024-01-15 | End: 2024-01-15

## 2024-01-15 RX ORDER — ACETAMINOPHEN 325 MG/1
975 TABLET ORAL EVERY 8 HOURS
Status: CANCELLED | OUTPATIENT
Start: 2024-01-15

## 2024-01-15 RX ORDER — KETOROLAC TROMETHAMINE 30 MG/ML
INJECTION, SOLUTION INTRAMUSCULAR; INTRAVENOUS AS NEEDED
Status: DISCONTINUED | OUTPATIENT
Start: 2024-01-15 | End: 2024-01-15 | Stop reason: HOSPADM

## 2024-01-15 RX ORDER — FENTANYL CITRATE/PF 50 MCG/ML
50 SYRINGE (ML) INJECTION
Status: DISCONTINUED | OUTPATIENT
Start: 2024-01-15 | End: 2024-01-15 | Stop reason: HOSPADM

## 2024-01-15 RX ORDER — ONDANSETRON 2 MG/ML
INJECTION INTRAMUSCULAR; INTRAVENOUS AS NEEDED
Status: DISCONTINUED | OUTPATIENT
Start: 2024-01-15 | End: 2024-01-15

## 2024-01-15 RX ORDER — HYDROMORPHONE HCL/PF 1 MG/ML
0.5 SYRINGE (ML) INJECTION
Status: DISCONTINUED | OUTPATIENT
Start: 2024-01-15 | End: 2024-01-15 | Stop reason: HOSPADM

## 2024-01-15 RX ORDER — OXYCODONE HYDROCHLORIDE 5 MG/1
5 TABLET ORAL EVERY 4 HOURS PRN
Status: DISCONTINUED | OUTPATIENT
Start: 2024-01-15 | End: 2024-01-15 | Stop reason: HOSPADM

## 2024-01-15 RX ORDER — OXYCODONE HYDROCHLORIDE 5 MG/1
5 TABLET ORAL EVERY 4 HOURS PRN
Qty: 30 TABLET | Refills: 0 | Status: SHIPPED | OUTPATIENT
Start: 2024-01-15 | End: 2024-01-22 | Stop reason: SDUPTHER

## 2024-01-15 RX ORDER — EPINEPHRINE 1 MG/ML
INJECTION, SOLUTION, CONCENTRATE INTRAVENOUS AS NEEDED
Status: DISCONTINUED | OUTPATIENT
Start: 2024-01-15 | End: 2024-01-15 | Stop reason: HOSPADM

## 2024-01-15 RX ORDER — SODIUM CHLORIDE 9 MG/ML
INJECTION INTRAVENOUS AS NEEDED
Status: DISCONTINUED | OUTPATIENT
Start: 2024-01-15 | End: 2024-01-15 | Stop reason: HOSPADM

## 2024-01-15 RX ORDER — FENTANYL CITRATE 50 UG/ML
INJECTION, SOLUTION INTRAMUSCULAR; INTRAVENOUS AS NEEDED
Status: DISCONTINUED | OUTPATIENT
Start: 2024-01-15 | End: 2024-01-15

## 2024-01-15 RX ORDER — CHLORHEXIDINE GLUCONATE 4 G/100ML
SOLUTION TOPICAL DAILY PRN
Status: DISCONTINUED | OUTPATIENT
Start: 2024-01-15 | End: 2024-01-15 | Stop reason: HOSPADM

## 2024-01-15 RX ORDER — OXYCODONE HYDROCHLORIDE 10 MG/1
10 TABLET ORAL EVERY 4 HOURS PRN
Status: DISCONTINUED | OUTPATIENT
Start: 2024-01-15 | End: 2024-01-15 | Stop reason: HOSPADM

## 2024-01-15 RX ORDER — DEXAMETHASONE SODIUM PHOSPHATE 10 MG/ML
INJECTION, SOLUTION INTRAMUSCULAR; INTRAVENOUS AS NEEDED
Status: DISCONTINUED | OUTPATIENT
Start: 2024-01-15 | End: 2024-01-15

## 2024-01-15 RX ORDER — SODIUM CHLORIDE 9 MG/ML
125 INJECTION, SOLUTION INTRAVENOUS CONTINUOUS
Status: DISCONTINUED | OUTPATIENT
Start: 2024-01-15 | End: 2024-01-15 | Stop reason: HOSPADM

## 2024-01-15 RX ORDER — PROPOFOL 10 MG/ML
INJECTION, EMULSION INTRAVENOUS AS NEEDED
Status: DISCONTINUED | OUTPATIENT
Start: 2024-01-15 | End: 2024-01-15

## 2024-01-15 RX ORDER — ACETAMINOPHEN 325 MG/1
975 TABLET ORAL ONCE
Status: COMPLETED | OUTPATIENT
Start: 2024-01-15 | End: 2024-01-15

## 2024-01-15 RX ORDER — TRANEXAMIC ACID 10 MG/ML
1000 INJECTION, SOLUTION INTRAVENOUS ONCE
Status: COMPLETED | OUTPATIENT
Start: 2024-01-15 | End: 2024-01-15

## 2024-01-15 RX ORDER — EPHEDRINE SULFATE 50 MG/ML
INJECTION INTRAVENOUS AS NEEDED
Status: DISCONTINUED | OUTPATIENT
Start: 2024-01-15 | End: 2024-01-15

## 2024-01-15 RX ORDER — VANCOMYCIN HYDROCHLORIDE 1 G/20ML
INJECTION, POWDER, LYOPHILIZED, FOR SOLUTION INTRAVENOUS AS NEEDED
Status: DISCONTINUED | OUTPATIENT
Start: 2024-01-15 | End: 2024-01-15 | Stop reason: HOSPADM

## 2024-01-15 RX ORDER — ACETAMINOPHEN 500 MG
1000 TABLET ORAL EVERY 8 HOURS PRN
Qty: 84 TABLET | Refills: 0 | Status: SHIPPED | OUTPATIENT
Start: 2024-01-15 | End: 2024-01-29

## 2024-01-15 RX ADMIN — EPHEDRINE SULFATE 10 MG: 50 INJECTION, SOLUTION INTRAVENOUS at 09:31

## 2024-01-15 RX ADMIN — EPHEDRINE SULFATE 10 MG: 50 INJECTION, SOLUTION INTRAVENOUS at 09:49

## 2024-01-15 RX ADMIN — BUPIVACAINE HYDROCHLORIDE 10 ML: 5 INJECTION, SOLUTION EPIDURAL; INTRACAUDAL; PERINEURAL at 08:22

## 2024-01-15 RX ADMIN — EPHEDRINE SULFATE 5 MG: 50 INJECTION, SOLUTION INTRAVENOUS at 09:24

## 2024-01-15 RX ADMIN — SODIUM CHLORIDE 125 ML/HR: 0.9 INJECTION, SOLUTION INTRAVENOUS at 06:41

## 2024-01-15 RX ADMIN — PROPOFOL 30 MG: 10 INJECTION, EMULSION INTRAVENOUS at 09:18

## 2024-01-15 RX ADMIN — TRANEXAMIC ACID 1000 MG: 10 INJECTION, SOLUTION INTRAVENOUS at 10:45

## 2024-01-15 RX ADMIN — ACETAMINOPHEN 325MG 975 MG: 325 TABLET ORAL at 06:35

## 2024-01-15 RX ADMIN — CHLORHEXIDINE GLUCONATE 0.12% ORAL RINSE 15 ML: 1.2 LIQUID ORAL at 06:40

## 2024-01-15 RX ADMIN — FENTANYL CITRATE 100 MCG: 50 INJECTION INTRAMUSCULAR; INTRAVENOUS at 08:16

## 2024-01-15 RX ADMIN — SODIUM CHLORIDE: 0.9 INJECTION, SOLUTION INTRAVENOUS at 09:34

## 2024-01-15 RX ADMIN — TRANEXAMIC ACID 1000 MG: 10 INJECTION, SOLUTION INTRAVENOUS at 09:32

## 2024-01-15 RX ADMIN — SODIUM CHLORIDE 125 ML/HR: 0.9 INJECTION, SOLUTION INTRAVENOUS at 11:57

## 2024-01-15 RX ADMIN — MIDAZOLAM 2 MG: 1 INJECTION INTRAMUSCULAR; INTRAVENOUS at 09:48

## 2024-01-15 RX ADMIN — PROPOFOL 80 MCG/KG/MIN: 10 INJECTION, EMULSION INTRAVENOUS at 09:18

## 2024-01-15 RX ADMIN — BUPIVACAINE HYDROCHLORIDE IN DEXTROSE 1.2 ML: 7.5 INJECTION, SOLUTION SUBARACHNOID at 09:17

## 2024-01-15 RX ADMIN — BUPIVACAINE 10 ML: 13.3 INJECTION, SUSPENSION, LIPOSOMAL INFILTRATION at 08:22

## 2024-01-15 RX ADMIN — OXYCODONE HYDROCHLORIDE 5 MG: 5 TABLET ORAL at 13:56

## 2024-01-15 RX ADMIN — MIDAZOLAM 4 MG: 1 INJECTION INTRAMUSCULAR; INTRAVENOUS at 08:16

## 2024-01-15 RX ADMIN — DEXAMETHASONE SODIUM PHOSPHATE 6 MG: 10 INJECTION INTRAMUSCULAR; INTRAVENOUS at 09:38

## 2024-01-15 RX ADMIN — ONDANSETRON 4 MG: 2 INJECTION INTRAMUSCULAR; INTRAVENOUS at 10:50

## 2024-01-15 NOTE — ANESTHESIA PREPROCEDURE EVALUATION
Procedure:  ARTHROPLASTY KNEE TOTAL, RIGHT SAMEDAY DISCHARGE (Right: Knee)    Relevant Problems   ANESTHESIA   (+) PONV (postoperative nausea and vomiting)      CARDIO   (+) Benign essential hypertension   (+) Hypercholesterolemia   (+) Pulmonary embolism (HCC)      MUSCULOSKELETAL   (+) Primary osteoarthritis of left knee   (+) Primary osteoarthritis of right knee      Other   (+) Chronic anticoagulation   (+) H/O blood clots   (+) Obesity   (+) Prediabetes        Physical Exam    Airway    Mallampati score: III  TM Distance: >3 FB  Neck ROM: full     Dental   No notable dental hx     Cardiovascular  Rhythm: regular, Rate: normal, Cardiovascular exam normal    Pulmonary  Pulmonary exam normal Breath sounds clear to auscultation    Other Findings    Anesthesia Plan  ASA Score- 3     Anesthesia Type- spinal with ASA Monitors.         Additional Monitors:     Airway Plan:     Comment: Adductor canal block--for postop pain control-- requested by surgeon, and discussed with patient preop.  Off warfarin X 3 days. Coags drawn on admission--pending..       Plan Factors-    Chart reviewed. EKG reviewed.  Existing labs reviewed. Patient summary reviewed.    Patient is not a current smoker. Patient not instructed to abstain from smoking on day of procedure. Patient did not smoke on day of surgery.    There is medical exclusion for perioperative obstructive sleep apnea risk education.        Induction- intravenous.    Postoperative Plan-     Informed Consent- Anesthetic plan and risks discussed with patient.

## 2024-01-15 NOTE — PHYSICAL THERAPY NOTE
PT EVALUATION    Pt. Name: Tru Sy JrDarell  Pt. Age: 63 y.o.  MRN: [id number]  LENGTH OF STAY: 0    Patient Active Problem List   Diagnosis    Primary osteoarthritis of right knee    Primary osteoarthritis of left knee    Benign essential hypertension    Statin intolerance    Pulmonary embolism (HCC)    Prediabetes    Obesity    Hypercholesterolemia    Factor V Leiden (HCC)    Chronic anticoagulation    H/O blood clots    PONV (postoperative nausea and vomiting)       Admitting Diagnoses:   Primary osteoarthritis of right knee [M17.11]    Past Medical History:   Diagnosis Date    H/O blood clots     pulmonary embolism    PONV (postoperative nausea and vomiting)        Past Surgical History:   Procedure Laterality Date    FOOT SURGERY Right     KNEE SURGERY Right 2009    SHOULDER SURGERY Bilateral        Imaging Studies:  XR knee 1 or 2 vw right    (Results Pending)        01/15/24 1342   PT Last Visit   PT Visit Date 01/15/24   Note Type   Note type Evaluation   Pain Assessment   Pain Assessment Tool 0-10   Pain Score 2   Pain Location/Orientation Orientation: Right;Location: Knee   Hospital Pain Intervention(s) Cold applied;Ambulation/increased activity;Elevated;Emotional support;Rest   Restrictions/Precautions   Weight Bearing Precautions Per Order Yes   RLE Weight Bearing Per Order WBAT   Other Precautions Multiple lines;Fall Risk;Pain   Home Living   Type of Home Apartment   Home Layout One level;Stairs to enter with rails   Bathroom Shower/Tub Tub/shower unit   Bathroom Toilet Standard   Bathroom Equipment Grab bars in shower   Home Equipment Walker  (pt owns)   Additional Comments Pt resides alone in 1 level apartment with 4 FAYE with railings on both sides   Prior Function   Level of [place] Independent with ADLs;Independent with functional mobility;Independent with IADLS   Lives With Alone   Receives Help From Friend(s)   IADLs Independent with driving;Independent with meal prep;Independent  with medication management   Falls in the last 6 months 0   Vocational 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 Pt reports supportive Muslim  that will assist Pt and friends that are assisting/staying over.   Cognition   Overall Cognitive Status WFL   Arousal/Participation Alert   Attention Within functional limits   Orientation Level Oriented X4   Following Commands Follows all commands and directions without difficulty   Comments Pt very pleasant and motivated to work with PT   Subjective   Subjective I want to go home   RUE Assessment   RUE Assessment X  (see OT note)   LUE Assessment   LUE Assessment X  (see OT note)   RLE Assessment   RLE Assessment   (did not assess R knee due to post op. Hip/ ankle WFL)   LLE Assessment   LLE Assessment WFL   Light Touch   RLE Light Touch Grossly intact   LLE Light Touch Grossly intact   Bed Mobility   Supine to Sit 5  Supervision   Additional items Increased time required;Verbal cues;Bedrails   Sit to Supine 5  Supervision   Additional items Bedrails;Increased time required;Verbal cues   Additional Comments Pt greeted in supine on stretcher   Transfers   Sit to Stand 5  Supervision   Additional items Increased time required;Verbal cues  (RW)   Stand to Sit 5  Supervision   Additional items Increased time required;Verbal cues  (RW)   Car transfer   (educated pt on how to properly get into car- PT demonstrated)   Additional Comments needed cueing for hand placement and RW   Ambulation/Elevation   Gait pattern Improper Weight shift;Decreased R stance;Excessively slow;Decreased toe off;Decreased heel strike   Gait Assistance 5  Supervision   Additional items Verbal cues   Assistive Device Rolling walker   Distance 50'x2   Stair Management Assistance 5  Supervision   Additional items Verbal cues;Increased time required   Stair Management Technique Two rails;Foreward;Step to  pattern   Number of Stairs 4   Balance   Static Sitting Good   Dynamic Sitting Fair +   Static Standing Fair   Dynamic Standing Fair -   Ambulatory Fair -   Activity Tolerance   Activity Tolerance Patient tolerated treatment well   Medical Staff Made Aware DIYA- Francy, YADI Avila\   Nurse Made Aware RN aware and updated on pt   Assessment   Prognosis Good   Problem List Decreased strength;Decreased range of motion;Decreased endurance;Impaired balance;Pain   Assessment Pt is a 63 y.o. male who presented to [facility] on [facility]2024 s/p R TKA done by Dr. Wood. Pt  has a past medical history of H/O blood clots and PONV (postoperative nausea and vomiting).. Pt greeted at bedside chair for PT evaluation on 01/15/24. Pt referred to PT for functional mobility evaluation & D/C planning w/ orders of activity as tolerated. PTA, pt reports being I w/o AD. Personal factors affecting pt at time of IE include: stairs to enter home and limited home support. Please find objective findings from PT assessment regarding body systems outlined above with impairments and limitations including weakness, decreased ROM, impaired balance, decreased endurance, impaired coordination, gait deviations, pain, decreased activity tolerance, and decreased functional mobility tolerance.  Please see flow sheet above for objective findings and level of assistance required for safe completion of functional tasks. Pt demonstrated dec endurance and tolerance to activity. Denies reports of dizziness or SOB t/o session. Pt was able to perform bed mobility and supine<> sit with S needing cues for LE management. Pt ambulated 50'x2 with RW and S to the stairs where he performed 4 steps with railings on both sides with S. Pt needed cues for RW technique and proper sequence of LE during the stairs. Pt's clinical presentation is currently stable . Patient was left supine with call bell and all needs within reach. Pt was educated on fall precautions and reinforced w/  good understanding. Based on pt presentation and impaired function, pt would benefit from level III, (minimal resource intensity) at D/C. From PT/mobility standpoint, pt would benefit from continued OP PT to address deficits as defined above and maximize level of independence and return pt to PLOF. CM to follow. Nsg staff to continue to mobilized pt (OOB in chair for all meals & ambulate in room/unit) as tolerated to prevent further decline in function. Nsg notified. Co-eval performed with OT to complete this evaluation for the pts best interest given pts medical complexity and functional level.The patient's AM-PAC Basic Mobility Inpatient Short Form Raw Score is 19. A Raw score of greater than 16 suggests the patient may benefit from discharge to home. Please also refer to the recommendation of the Physical Therapist for safe discharge planning.   Barriers to Discharge None   Goals   Patient Goals to go home   Plan   PT Frequency   (Pt to DC home today)   Discharge Recommendation   Rehab Resource Intensity Level, PT III (Minimum Resource Intensity)   Equipment Recommended Walker  (pt owns)   AM-PAC Basic Mobility Inpatient   Turning in Flat Bed Without Bedrails 4   Lying on Back to Sitting on Edge of Flat Bed Without Bedrails 3   Moving Bed to Chair 3   Standing Up From Chair Using Arms 3   Walk in Room 3   Climb 3-5 Stairs With Railing 3   Basic Mobility Inpatient Raw Score 19   Basic Mobility Standardized Score 42.48   Highest Level Of Mobility   JH-HLM Goal 6: Walk 10 steps or more   JH-HLM Achieved 7: Walk 25 feet or more   End of Consult   Patient Position at End of Consult Supine;All needs within reach   End of Consult Comments pt stable, RN aware   Hx/personal factors: co-morbidities, dec caregiver support, home alone, mutliple lines, pain, and fall risk  Examination: dec mobility, dec balance, dec endurance, dec amb, risk for falls, pain, assessed body system, balance, endurance, amb, D/C disposition & fall  risk, impairements in locomotion, musculoskeletal, balance, endurance, posture, coordination  Clinical: unpredictable (risk for falls, POD #0, and pain mgt)  Complexity: high    Smiley Pruitt, PT

## 2024-01-15 NOTE — ANESTHESIA PROCEDURE NOTES
Spinal Block    Patient location during procedure: OR  Start time: 1/15/2024 9:17 AM  Reason for block: procedure for pain and at surgeon's request  Staffing  Performed by: Scarlett Betancourt DO  Authorized by: Scarlett Betancourt DO    Preanesthetic Checklist  Completed: patient identified, IV checked, site marked, risks and benefits discussed, surgical consent, monitors and equipment checked, pre-op evaluation and timeout performed  Spinal Block  Patient position: sitting  Prep: ChloraPrep  Patient monitoring: cardiac monitor and frequent blood pressure checks  Approach: midline  Location: L3-4  Injection technique: single-shot  Needle  Needle type: pencil-tip   Needle gauge: 25 G  Needle length: 10 cm  Assessment  Sensory level: T4  Injection Assessment:  negative aspiration for heme, no paresthesia on injection and positive aspiration for clear CSF.  Post-procedure:  adhesive bandage applied, pressure dressing applied, secured with tape, site cleaned and sterile dressing applied

## 2024-01-15 NOTE — ANESTHESIA POSTPROCEDURE EVALUATION
Post-Op Assessment Note    CV Status:  Stable  Pain Score: 0    Pain management: adequate       Mental Status:  Alert and awake   Hydration Status:  Euvolemic   PONV Controlled:  Controlled   Airway Patency:  Patent  Two or more mitigation strategies used for obstructive sleep apnea   Post Op Vitals Reviewed: Yes      Staff: Anesthesiologist               BP      Temp      Pulse     Resp      SpO2      /66   Pulse 74   Temp (!) 97 °F (36.1 °C)   Resp 16   Wt 99.5 kg (219 lb 5.7 oz)   SpO2 98%   BMI 38.86 kg/m²

## 2024-01-15 NOTE — OP NOTE
OPERATIVE REPORT  PATIENT NAME: Tru Sy Jr.  : 1960  MRN: [id number]  Pt Location:  [facility] OR ROOM 01    Surgery Date: 1/15/2024    Surgeons and Role:     * Rick Wood MD - Primary     * Lina Cee PA-C - Assisting     Preop Diagnosis:  Primary osteoarthritis of right knee [M17.11]    Post-Op Diagnosis Codes:     * Primary osteoarthritis of right knee [M17.11]    Procedure(s):  Right - ARTHROPLASTY KNEE TOTAL. RIGHT SAMEDAY DISCHARGE    Specimens:  * No specimens in log *    Estimated Blood Loss:   Minimal    Drains:  * No LDAs found *    Anesthesia Type:   Spinal     Operative Indications:  Primary osteoarthritis of right knee [M17.11]  See clinic note for complete list of indications    Operative Findings:  Full-thickness chondral wear to the medial compartment    Complications:   None    Knee Approach: Medial Parapatellar    Procedure and Technique:  IMPLANTS:    1.  Medacta SpheriKA size 2 femur  2.  Medacta SpheriKA size 3 tibial baseplate.   3.  Medacta SpheriKA tibial liner thickness 12mm.         DESCRIPTION OF OPERATION:   After adequate anesthesia was induced, the patient was placed on the operating table and all bony prominences were padded.  An upper thigh tourniquet was placed and the right lower extremity was prepped and draped in the usual sterile fashion. A time out was performed verifying the patient, procedure, laterality and implants.  All were in agreement and the procedure began.    The limb was elevated for exsanguinated and the tourniquet was inflated to 250 mmHg. Our planned incision was made with a 10 blade scalpel. Dissection was carried down sharply through the skin and subcutaneous fat to the level of the extensor mechanism.  Full-thickness medial and lateral flaps were raised with Metzenbaum mena.  A clean #10 scalpel was then used to perform a medial parapatellar arthrotomy.  Electrocautery was then used to perform a medial proximal tibial capsular peel, excision  of the suprapatellar fat pad, and excision of the retropatellar fat pad.  The knee was brought to flexion allowing for excision of the ACL and anterior horn of the lateral meniscus.    The patella was then everted and inspected, peripheral osteophytes were removed with a rongeur and peripatellar neurolysis was performed. The decision was made to maintain the native patella.    Our attention then turned to the distal femur.  The cartilage was inspected and was found to have full thickness chondral lesions on the Medial condyles. Any remaining cartilage on the damaged condyle(s) was removed with a ring curette. A drill hole was placed in the distal femur and the fluted intramedullary guide chet was placed down the canal.  A distal femoral cutting guide was affixed to the femur and an oscillating saw was used to perform the distal femoral osteotomy.  The distal femoral resections were measured with calipers and recuts were made as needed. The posterior femoral condyles were then inspected for chondral lesions. Again, remaining cartilage on damaged condyle(s) was removed. The femoral sizing guide was then used and the appropriate component size was selected.  The 4-in-1 cutting block was affixed to the distal femur ensuring not to notch the anterior cortex.  Posterior condyles were cut and the bony fragments were again measured with calipers.  Recuts were made as needed.  An oscillating saw was then used to complete the remaining cuts.    Our attention then turned to the proximal tibia. An extramedullary cutting guide was pinned in place with the appropriate slope, coronal angulation and depth of cut.  Being sure to protect soft tissues, the proximal tibia was resected with an oscillating saw. The tibial resection was removed, the proximal tibia was sized and the knee was brought into extension where the menisci were removed with electrocautery. The knee was then flexed and posterior femoral osteophytes were removed with  an osteotome and curette. A sizing block was inserted in both flexion and extension. The tibia was re-cut if necessary to achieve excellent balance in both planes.     A tibial trial was pinned in place followed by the trial femur and liner. The knee was taken through range of motion and found to have excellent stability throughout the arc of motion.  Trials were removed, pericapsular tissues were injected with local anesthetic and the knee was thoroughly irrigated with sterile saline.    The femoral and tibial bone ends were meticulously jet lavaged with saline and dried. Methyl methacrylate bone cement was then pressurized onto the cancellous surfaces of the tibia and the femur. The prosthetic components were inserted, excess cement was removed and the knee was placed into extension allowing the cement to polymerized. Dilute betadine solution and normal saline were used sequentially to irrigate the soft tissues prior to closure. The patella button applicator was removed after cement was verified on the back table to have cured.     At this point, our attention turned to closure. 1g Vancomycin powder was placed into the wound. The knee joint and extensor mechanism was closed using running #1 barbed suture. 2-0 vicryl interrupted sutures were used to close subcutaneous tissue. 3-0 monocryl suture was then used to close the skin edges. A glue and mesh dressing was then placed over the incision. A sterile compressive dressing was applied, the patient was awakened, and sent to the recovery room in stable condition. There were no complications. The sponge and needle counts were given as correct x 2.     No qualified resident was available to participate in this case. Lina Cee PA-c was necessary to assist with positioning the patient, prepping and draping, and assisting with wound closure. Dr. Rick Wood was present and performed all the key portions of the procedure.           SIGNATURE: Rick Wood,  MD  DATE: January 15, 2024  TIME: 11:18 AM

## 2024-01-15 NOTE — OCCUPATIONAL THERAPY NOTE
Occupational Therapy Evaluation     Patient Name: Tru Sy Jr.  Today's Date: 1/15/2024  Problem List  Principal Problem:    Primary osteoarthritis of right knee  Active Problems:    H/O blood clots    PONV (postoperative nausea and vomiting)    Past Medical History  Past Medical History:   Diagnosis Date    H/O blood clots     pulmonary embolism    PONV (postoperative nausea and vomiting)      Past Surgical History  Past Surgical History:   Procedure Laterality Date    FOOT SURGERY Right     KNEE SURGERY Right 2009    SHOULDER SURGERY Bilateral          01/15/24 1343   OT Last Visit   OT Visit Date 01/15/24   Note Type   Note type Evaluation   Pain Assessment   Pain Assessment Tool 0-10   Pain Score 2   Pain Location/Orientation Orientation: Right;Location: Knee   Hospital Pain Intervention(s) Ambulation/increased activity;Repositioned   Restrictions/Precautions   Weight Bearing Precautions Per Order Yes   RLE Weight Bearing Per Order WBAT   Other Precautions Fall Risk;Multiple lines;Pain   Home Living   Type of Home Apartment   Home Layout One level;Stairs to enter with rails   Bathroom Shower/Tub Tub/shower unit   Bathroom Toilet Standard   Bathroom Equipment Grab bars in shower   Home Equipment Walker   Additional Comments Pt resides alone in a one level apartment with 5 FAYE.   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 Pt reports supportive Muslim  that will assist Pt and friends that are assisting/staying over.   Lifestyle   Autonomy I with ADLs/IADLs/no AD/ +/works FT   Reciprocal  Relationships Supportive /friends   Service to Others FTE   Intrinsic Gratification Enjoys Denominational   ADL   Where Assessed Edge of bed   Eating Assistance 7  Independent   Grooming Assistance 7  Independent   UB Bathing Assistance 7  Independent   LB Bathing Assistance 5  Supervision/Setup   UB Dressing Assistance 7  Independent   LB Dressing Assistance 5  Supervision/Setup   Toileting Assistance  5  Supervision/Setup   Functional Assistance 5  Supervision/Setup   Additional Comments Pt provided with TKA educational handout and review compensatory LB ADL/toileting techniques with Pt. Pt reports understanding.   Bed Mobility   Supine to Sit 5  Supervision   Additional items Increased time required;Verbal cues   Sit to Supine 5  Supervision   Additional items Increased time required   Additional Comments Pt greeted supine on stretcher.   Transfers   Sit to Stand 5  Supervision   Additional items Verbal cues   Stand to Sit 5  Supervision   Additional items Verbal cues   Car transfer   (Educated on car transfer- therapist provided demonstration)   Additional Comments with RW   Functional Mobility   Functional Mobility 5  Supervision   Additional Comments S with functional mobility with RW-long household distances   Additional items Rolling walker   Balance   Static Sitting Good   Dynamic Sitting Fair +   Static Standing Fair   Dynamic Standing Fair -   Ambulatory Fair -   Activity Tolerance   Activity Tolerance Patient tolerated treatment well   Medical Staff Made Aware Co-eval with MISAEL Reis 2* to Pt's medical complexity and decreased endurance.   Nurse Made Aware RN cleared/updated.   RUE Assessment   RUE Assessment WFL   LUE Assessment   LUE Assessment WFL   Hand Function   Gross Motor Coordination Functional   Fine Motor Coordination Functional   Sensation   Light Touch No apparent deficits   Vision-Basic Assessment   Current Vision Wears glasses only for reading   Psychosocial   Psychosocial (WDL) WDL    Cognition   Overall Cognitive Status WFL   Arousal/Participation Alert;Cooperative   Attention Within functional limits   Orientation Level Oriented X4   Memory Within functional limits   Following Commands Follows all commands and directions without difficulty   Comments Pt very pleasant and cooperative during OT session.   Assessment   Limitation Decreased endurance;Decreased high-level ADLs   Prognosis Good   Assessment Pt is a 63 y.o. male who [facility] with OA of R knee. Pt s/p R TKA on 1/15. Pt WBAT on RLE. Pt  has a past medical history of H/O blood clots and PONV (postoperative nausea and vomiting). Pt greeted bedside for OT evaluation on 1/15/2024. Pt resides alone in a one level apartment with 5 FAYE. PTA, Pt reports being I with ADLs/IADLs/no AD/ +/works FT. Pt reports supportive [facility]  that will assist Pt and friends that are assisting/staying over. Pt demonstrating the following occupational performance levels:I with UB ADLs, S with LB ADLs, S with bed mobility, S with functional transfers, and S with functional mobility with RW. Pt provided with TKA handout and Pt reports understanding of ADL compensatory techniques. Pt encouraged to participate in ADLs/functional mobility with nursing/restorative staff during hospitalization. Pt with no further acute OT concerns. Pt would benefit from returning home with increased social support upon DC to maximize safety and independence with ADLs and functional tasks of choice. DC skilled OT services.   Goals   Patient Goals To go home today.   Plan   OT Frequency Eval only   Discharge Recommendation   Rehab Resource Intensity Level, OT No post-acute rehabilitation needs   Equipment Recommended Shower/Tub chair with back ($)   Additional Comments  The patient's raw score on the AM-PAC Daily Activity Inpatient Short Form is 21. A raw score of greater than or equal to 19 suggests the patient may benefit from discharge to home. Please  refer to the recommendation of the Occupational Therapist for safe discharge planning.   AM-PAC Daily Activity Inpatient   Lower Body Dressing 3   Bathing 3   Toileting 3   Upper Body Dressing 4   Grooming 4   Eating 4   Daily Activity Raw Score 21   Daily Activity Standardized Score (Calc for Raw Score >=11) 44.27   AM-PAC Applied Cognition Inpatient   Following a Speech/Presentation 4   Understanding Ordinary Conversation 4   Taking Medications 4   Remembering Where Things Are Placed or Put Away 4   Remembering List of 4-5 Errands 4   Taking Care of Complicated Tasks 4   Applied Cognition Raw Score 24   Applied Cognition Standardized Score 62.21   End of Consult   Education Provided Yes   Patient Position at End of Consult All needs within reach;Supine   Nurse Communication Nurse aware of consult       Francy Roldan MS, OTR/L

## 2024-01-15 NOTE — DISCHARGE INSTR - AVS FIRST PAGE
Rick Wood MD  Adult Reconstruction Specialist   [facility]  Office Phone: [phone]    Discharge Instructions - Knee Replacement    What to Expect/Activity  It is normal to have some discomfort in your knee for several days to weeks.  You are weight bearing as tolerated to your operative leg with assist devices.  Please use crutches/walker when ambulating as needed until your follow-up  Swelling and discomfort in the knee is normal for several days after surgery. For the first 2-3 days, use ice around the knee to help. Use for 20-30 minutes every 1-2 hours for 48 hours, while awake. You may continue beyond 48 hours as needed.  Place one or two pillows underneath your calf, not your knee, to reduce swelling.    Dressing/Wound Care/Bathing  You may remove your surgical dressing 5 days after surgery.  You may start showering 5 days after surgery. Please pat the dressing dry. If you notice the dressing appears saturated or is starting to come off, please over-wrap with dry dressing.  You may keep the dressing in place until follow-up in the office.   Do not place any creams, ointments or gels on or around the incision.  No baths, swimming or submerging until cleared by Dr. Wood    Pain Management/Medications  You may resume your usual medications.  Please take the following medications:  Anti-coagulation (blood clot prevention) - Please complete Lovenox-to-Coumadin bridge as discussed with your medical physician  Pain medication:  Narcotic Medication: Oxycodone 5 mg, 1 tablet every 4-6 hours as needed for severe pain  NSAID (Anti-inflammatory): Celebrex 200 mg, 1 tablet twice a day as needed for mild to moderate pain  Tylenol 1000 mg, 1 tablet every 8 hours as needed for mild to moderate pain  If you have questions or pain concerns, please contact the office. Pain medication cannot remove all post-operative pain    Follow up/Call if:  The findings of your surgery will be  explained to you and your family immediately after surgery. However, in the post-operative period, during recovery from anesthesia you may not fully remember or fully understand what was said. This will be again gone over when you return for your post-op appointment.  Please contact Dr. Wood's office if you experience the following:  Excessive bleeding (bleeding through your dressing)  Fever greater than 101 degrees F after 48 hours (low grade fevers the day or two after surgery are normal)  Persistent nausea or vomiting  Decreased sensation or discoloration of the operative limb  Pain or swelling that is getting worse and not better with medication    Dr. Wood's Office Contact: [phone]

## 2024-01-15 NOTE — PLAN OF CARE
Problem: PHYSICAL THERAPY ADULT  Goal: Performs mobility at highest level of function for planned discharge setting.  See evaluation for individualized goals.  Description:    Equipment Recommended: Walker (pt owns)       See flowsheet documentation for full assessment, interventions and recommendations.  Outcome: Adequate for Discharge  Note: Prognosis: Good  Problem List: Decreased strength, Decreased range of motion, Decreased endurance, Impaired balance, Pain  Assessment: Pt is a 63 y.o. male who presented to [facility] on 1/15/2024 s/p R TKA done by Dr. Wood. Pt  has a past medical history of H/O blood clots and PONV (postoperative nausea and vomiting).. Pt greeted at bedside chair for PT evaluation on 01/15/24. Pt referred to PT for functional mobility evaluation & D/C planning w/ orders of activity as tolerated. PTA, pt reports being I w/o AD. Personal factors affecting pt at time of IE include: stairs to enter home and limited home support. Please find objective findings from PT assessment regarding body systems outlined above with impairments and limitations including weakness, decreased ROM, impaired balance, decreased endurance, impaired coordination, gait deviations, pain, decreased activity tolerance, and decreased functional mobility tolerance.  Please see flow sheet above for objective findings and level of assistance required for safe completion of functional tasks. Pt demonstrated dec endurance and tolerance to activity. Denies reports of dizziness or SOB t/o session. Pt was able to perform bed mobility and supine<> sit with S needing cues for LE management. Pt ambulated 50'x2 with RW and S to the stairs where he performed 4 steps with railings on both sides with S. Pt needed cues for RW technique and proper sequence of LE during the stairs. Pt's clinical presentation is currently stable . Patient was left supine with call bell and all needs within reach. Pt was educated on fall precautions and  reinforced w/ good understanding. Based on pt presentation and impaired function, pt would benefit from level III, (minimal resource intensity) at D/C. From PT/mobility standpoint, pt would benefit from continued OP PT to address deficits as defined above and maximize level of independence and return pt to PLOF. CM to follow. Nsg staff to continue to mobilized pt (OOB in chair for all meals & ambulate in room/unit) as tolerated to prevent further decline in function. Nsg notified. Co-eval performed with OT to complete this evaluation for the pts best interest given pts medical complexity and functional level.The patient's AM-PAC Basic Mobility Inpatient Short Form Raw Score is 19. A Raw score of greater than 16 suggests the patient may benefit from discharge to home. Please also refer to the recommendation of the Physical Therapist for safe discharge planning.  Barriers to Discharge: None     Rehab Resource Intensity Level, PT: III (Minimum Resource Intensity)    See flowsheet documentation for full assessment.

## 2024-01-15 NOTE — ANESTHESIA PROCEDURE NOTES
Peripheral Block    Patient location during procedure: holding area  Start time: 1/15/2024 8:16 AM  Reason for block: at surgeon's request and post-op pain management  Staffing  Performed by: Glory Mancia DO  Authorized by: Glory Mancia DO    Preanesthetic Checklist  Completed: patient identified, IV checked, site marked, risks and benefits discussed, surgical consent, monitors and equipment checked, pre-op evaluation and timeout performed  Peripheral Block  Patient position: supine  Prep: ChloraPrep  Patient monitoring: frequent blood pressure checks, continuous pulse oximetry and heart rate  Block type: Adductor Canal  Laterality: right  Injection technique: single-shot  Procedures: ultrasound guided, Ultrasound guidance required for the procedure to increase accuracy and safety of medication placement and decrease risk of complications.  Ultrasound permanent image savedbupivacaine liposomal (EXPAREL) 1.3 % injection 20 mL - Perineural   10 mL - 1/15/2024 8:22:00 AM  bupivacaine (PF) (MARCAINE) 0.5 % injection 20 mL - Perineural   10 mL - 1/15/2024 8:22:00 AM  Needle  Needle type: Stimuplex   Needle length: 4 in  Needle localization: anatomical landmarks and ultrasound guidance  Assessment  Injection assessment: incremental injection, frequent aspiration, injected with ease, negative aspiration, negative for heart rate change, no paresthesia on injection, no symptoms of intraneural/intravenous injection and needle tip visualized at all times  Paresthesia pain: none  Post-procedure:  site cleaned  patient tolerated the procedure well with no immediate complications

## 2024-01-15 NOTE — INTERVAL H&P NOTE
H&P reviewed. After examining the patient I find no changes in the patients condition since the H&P had been written.    Vitals:    01/15/24 0645   BP: (!) 186/93   Pulse:    Resp:    Temp:    SpO2:

## 2024-01-16 ENCOUNTER — TELEPHONE (OUTPATIENT)
Dept: OBGYN CLINIC | Facility: HOSPITAL | Age: 64
End: 2024-01-16

## 2024-01-22 DIAGNOSIS — M17.11 PRIMARY OSTEOARTHRITIS OF RIGHT KNEE: ICD-10-CM

## 2024-01-22 RX ORDER — OXYCODONE HYDROCHLORIDE 5 MG/1
5 TABLET ORAL EVERY 4 HOURS PRN
Qty: 15 TABLET | Refills: 0 | Status: SHIPPED | OUTPATIENT
Start: 2024-01-22

## 2024-01-22 NOTE — TELEPHONE ENCOUNTER
Reason for call: (Pt state he had surgery 01.15.23 and he only have enough oxycodone until tomorrow, he depends on ride to get to the pharm)  [x] Refill   [] Prior Auth  [] Other:     Office:   [] PCP/Provider -   [x] Specialty/Provider - Dr Wood    Medication: oxycodone    Dose/Frequency: 5 mg Q4H Prn    Quantity: 30D supply    Pharmacy: [facility] on file    Does the patient have enough for 3 days?   [] Yes   [x] No - Send as HP to POD

## 2024-01-22 NOTE — TELEPHONE ENCOUNTER
Refill must be reviewed and completed by the office or provider. The refill is unable to be approved by the medication management team.

## 2024-01-25 ENCOUNTER — OFFICE VISIT (OUTPATIENT)
Dept: OBGYN CLINIC | Facility: CLINIC | Age: 64
End: 2024-01-25
Payer: OTHER MISCELLANEOUS

## 2024-01-25 VITALS
BODY MASS INDEX: 38.62 KG/M2 | SYSTOLIC BLOOD PRESSURE: 173 MMHG | HEIGHT: 63 IN | WEIGHT: 218 LBS | DIASTOLIC BLOOD PRESSURE: 92 MMHG | HEART RATE: 68 BPM

## 2024-01-25 DIAGNOSIS — M17.12 PRIMARY OSTEOARTHRITIS OF LEFT KNEE: Primary | ICD-10-CM

## 2024-01-25 DIAGNOSIS — Z96.651 S/P TOTAL KNEE REPLACEMENT, RIGHT: ICD-10-CM

## 2024-01-25 PROBLEM — M17.11 PRIMARY OSTEOARTHRITIS OF RIGHT KNEE: Status: RESOLVED | Noted: 2023-11-13 | Resolved: 2024-01-25

## 2024-01-25 PROCEDURE — 99024 POSTOP FOLLOW-UP VISIT: CPT | Performed by: STUDENT IN AN ORGANIZED HEALTH CARE EDUCATION/TRAINING PROGRAM

## 2024-01-25 PROCEDURE — 20610 DRAIN/INJ JOINT/BURSA W/O US: CPT | Performed by: STUDENT IN AN ORGANIZED HEALTH CARE EDUCATION/TRAINING PROGRAM

## 2024-01-25 RX ORDER — BUPIVACAINE HYDROCHLORIDE 2.5 MG/ML
1 INJECTION, SOLUTION INFILTRATION; PERINEURAL
Status: COMPLETED | OUTPATIENT
Start: 2024-01-25 | End: 2024-01-25

## 2024-01-25 RX ORDER — BETAMETHASONE SODIUM PHOSPHATE AND BETAMETHASONE ACETATE 3; 3 MG/ML; MG/ML
12 INJECTION, SUSPENSION INTRA-ARTICULAR; INTRALESIONAL; INTRAMUSCULAR; SOFT TISSUE
Status: COMPLETED | OUTPATIENT
Start: 2024-01-25 | End: 2024-01-25

## 2024-01-25 RX ORDER — LIDOCAINE HYDROCHLORIDE 10 MG/ML
1 INJECTION, SOLUTION EPIDURAL; INFILTRATION; INTRACAUDAL; PERINEURAL
Status: COMPLETED | OUTPATIENT
Start: 2024-01-25 | End: 2024-01-25

## 2024-01-25 RX ADMIN — LIDOCAINE HYDROCHLORIDE 1 ML: 10 INJECTION, SOLUTION EPIDURAL; INFILTRATION; INTRACAUDAL; PERINEURAL at 12:15

## 2024-01-25 RX ADMIN — BETAMETHASONE SODIUM PHOSPHATE AND BETAMETHASONE ACETATE 12 MG: 3; 3 INJECTION, SUSPENSION INTRA-ARTICULAR; INTRALESIONAL; INTRAMUSCULAR; SOFT TISSUE at 12:15

## 2024-01-25 RX ADMIN — BUPIVACAINE HYDROCHLORIDE 1 ML: 2.5 INJECTION, SOLUTION INFILTRATION; PERINEURAL at 12:15

## 2024-01-25 NOTE — PROGRESS NOTES
Date: 24  Tru Sy Jr.   MRN# [id number]  : 1960      Chief Complaint: Post op right total knee arthroplasty    Assessment and Plan:    S/P total knee replacement, right  Patient 2 weeks status post right total knee arthroplasty, doing well    -WBAT  -Activity modification to limit strain or impact on the joint  -Resume warfarin as previously prescribed  -Tylenol as needed. Do not exceed 3000mg daily  -Begin supervised physical therapy. Script provided.  Patient may defer this treatment if desired as he has regained significant flexion and extension about the knee on his own.  -Home exercise program directed by PT  -Oxycodone for breakthrough pain for 1 more week  -Cane recommended to offload joint as needed  -Patient may follow up in 1 month(s) with radiographs for further evaluation and treatment       Primary osteoarthritis of left knee  Patient has a history, physical examination and radiographic evaluation consistent with left knee osteoarthritis.       -Patient aware that he would qualify for left knee arthroplasty.  He will consider this as he recovers from his right total knee replacement  -WBAT  -Activity modification to limit strain or impact on the joint  -NSAIDs contraindicated given warfarin  -Tylenol as needed. Do not exceed 3000mg daily  -Supervised physical therapy. Script provided   -Home exercise program directed by PT  -Weight loss discussed   -Knee sleeve or brace for comfort  -Patient will likely schedule total knee arthroplasty later in the year       Subjective:     Patient is 2 weeks s/p right primary total  knee arthroplasty.    Date of procedure: 1/15/24  Doing well, no new problems  Pain progressively improving  Improved swelling  Ambulating with cane    Allergy:  Allergies   Allergen Reactions    Statins Myalgia    Other Myalgia     Medications:  all current active meds have been reviewed    Past Medical History:  Past Medical History:   Diagnosis Date    H/O  "blood clots     pulmonary embolism    PONV (postoperative nausea and vomiting)      Past Surgical History:  Past Surgical History:   Procedure Laterality Date    FOOT SURGERY Right     KNEE SURGERY Right     [place] ARTHRP KNE CONDYLE&PLATU MEDIAL&LAT COMPARTMENTS Right 1/15/2024    Procedure: ARTHROPLASTY KNEE TOTAL, RIGHT SAMEDAY DISCHARGE;  Surgeon: Rick Wood MD;  Location: [facility];  Service: Orthopedics    SHOULDER SURGERY Bilateral      Family History:  Family History   Problem Relation Age of Onset    No Known Problems Mother     No Known Problems Father      Social History:  Social History     Substance and Sexual Activity   Alcohol Use Not Currently     Social History     Substance and Sexual Activity   Drug Use Never     Social History     Tobacco Use   Smoking Status Former    Current packs/day: 0.00    Types: Cigarettes    Quit date:     Years since quittin.0    Passive exposure: Never   Smokeless Tobacco Never           Review of Systems:  General- denies fever/chills  HEENT- denies hearing loss or sore throat  Eyes- denies eye pain or visual disturbances, denies red eyes  Respiratory- denies cough or SOB  Cardio- denies chest pain or palpitations  GI- denies abdominal pain  Endocrine- denies urinary frequency  Urinary- denies pain with urination  Musculoskeletal- Negative except noted above  Skin- denies rashes or wounds  Neurological- denies dizziness or headache  Psychiatric- denies anxiety or difficulty concentrating      Objective:   BP Readings from Last 1 Encounters:   24 (!) 173/92      Wt Readings from Last 1 Encounters:   24 98.9 kg (218 lb)      Pulse Readings from Last 1 Encounters:   24 68        BMI: Estimated body mass index is 38.62 kg/m² as calculated from the following:    Height as of this encounter: 5' 3\" (1.6 m).    Weight as of this encounter: 98.9 kg (218 lb).      Physical Exam  BP (!) 173/92   Pulse 68   Ht 5' 3\" (1.6 m)   Wt 98.9 kg (218 " lb)   BMI 38.62 kg/m²   General/Constitutional: No apparent distress: well-nourished and well developed.  Eyes: normal ocular motion  Cardio: RRR, Normal S1S2, No m/r/g.   Lymphatic: No appreciable lymphadenopathy  Respiratory: Non-labored breathing, CTA b/l no w/c/r  Vascular: No edema, swelling or tenderness, except as noted in detailed exam. Extremities well perfused. No LE edema  Integumentary: No impressive skin lesions present, except as noted in detailed exam.  Neuro: No ataxia or tremors noted  Psych: Normal mood and affect, oriented to person, place and time. Appropriate affect.  Musculoskeletal: Normal, except as noted in detailed exam and in HPI.    Gait and Station:   antalgic    right Knee Examination  Incision: healing well  Effusion: moderate  Alignment: normal  AP Stability: stable  Coronal Stability  Extension:stable  Mid-flexion: stable  90 degrees flexion: stable  Range of motion  Active: 5 to 110    Passive: 0 to 110  Extensor lag: Absent  Motor: 5/5 EHL/FHL/TA/GS/QD/HS  Neurovascular exam is intact.   No evidence of calf tightness or posterior cords    Left knee examination  Skin intact  Minimal effusion  Medial and lateral joint line tenderness  Range of motion 0-120 degrees with pain  Stable in coronal plane at full extension  Stable in coronal plane at mid flexion  Stable to AP shuck and 90 degrees of flexion  SILT dp/sp/t/jer/saph  Motor 5/5 ehl/fhl/ta/gs/qd/hs  Extremity warm and well-perfused      Images:    Previous radiographs of the left knee reveal severe tricompartmental osteoarthritis    Large joint arthrocentesis: L knee  Universal Protocol:  Consent: Verbal consent obtained.  Site marked: the operative site was marked  Supporting Documentation  Indications: pain and diagnostic evaluation   Procedure Details  Location: knee - L knee  Preparation: Patient was prepped and draped in the usual sterile fashion  Needle size: 22 G  Ultrasound guidance: no  Approach:  anterolateral  Medications administered: 1 mL bupivacaine 0.25 %; 12 mg betamethasone acetate-betamethasone sodium phosphate 6 (3-3) mg/mL; 1 mL lidocaine (PF) 1 %    Patient tolerance: patient tolerated the procedure well with no immediate complications  Dressing:  Sterile dressing applied           Scribe Attestation      I,:   am acting as a scribe while in the presence of the attending physician.:       I,:   personally performed the services described in this documentation    as scribed in my presence.:               Rick Wood MD  Adult Reconstruction Specialist   [facility]

## 2024-01-25 NOTE — ASSESSMENT & PLAN NOTE
Patient 2 weeks status post right total knee arthroplasty, doing well    -WBAT  -Activity modification to limit strain or impact on the joint  -Resume warfarin as previously prescribed  -Tylenol as needed. Do not exceed 3000mg daily  -Begin supervised physical therapy. Script provided.  Patient may defer this treatment if desired as he has regained significant flexion and extension about the knee on his own.  -Home exercise program directed by PT  -Oxycodone for breakthrough pain for 1 more week  -Cane recommended to offload joint as needed  -Patient may follow up in 1 month(s) with radiographs for further evaluation and treatment

## 2024-01-25 NOTE — ASSESSMENT & PLAN NOTE
Patient has a history, physical examination and radiographic evaluation consistent with left knee osteoarthritis.       -Patient aware that he would qualify for left knee arthroplasty.  He will consider this as he recovers from his right total knee replacement  -WBAT  -Activity modification to limit strain or impact on the joint  -NSAIDs contraindicated given warfarin  -Tylenol as needed. Do not exceed 3000mg daily  -Supervised physical therapy. Script provided   -Home exercise program directed by PT  -Weight loss discussed   -Knee sleeve or brace for comfort  -Patient will likely schedule total knee arthroplasty later in the year

## 2024-01-29 ENCOUNTER — TELEPHONE (OUTPATIENT)
Age: 64
End: 2024-01-29

## 2024-01-29 NOTE — TELEPHONE ENCOUNTER
Caller: Vandana from [facility]    Doctor: Nina    Reason for call:     Vandana is requesting the OP Physical Therapy script be faxed to her office at [phone], patient recently had total right knee surgery.  Script written on 1/25/24.    Call back#: fax script

## 2024-02-23 ENCOUNTER — OFFICE VISIT (OUTPATIENT)
Dept: OBGYN CLINIC | Facility: CLINIC | Age: 64
End: 2024-02-23

## 2024-02-23 ENCOUNTER — APPOINTMENT (OUTPATIENT)
Dept: RADIOLOGY | Age: 64
End: 2024-02-23
Payer: OTHER MISCELLANEOUS

## 2024-02-23 VITALS
WEIGHT: 218 LBS | HEART RATE: 69 BPM | HEIGHT: 63 IN | DIASTOLIC BLOOD PRESSURE: 86 MMHG | SYSTOLIC BLOOD PRESSURE: 164 MMHG | BODY MASS INDEX: 38.62 KG/M2

## 2024-02-23 DIAGNOSIS — Z96.651 S/P TOTAL KNEE REPLACEMENT, RIGHT: ICD-10-CM

## 2024-02-23 DIAGNOSIS — Z96.651 S/P TOTAL KNEE REPLACEMENT, RIGHT: Primary | ICD-10-CM

## 2024-02-23 PROCEDURE — 73562 X-RAY EXAM OF KNEE 3: CPT

## 2024-02-23 PROCEDURE — 99024 POSTOP FOLLOW-UP VISIT: CPT | Performed by: STUDENT IN AN ORGANIZED HEALTH CARE EDUCATION/TRAINING PROGRAM

## 2024-02-23 NOTE — LETTER
February 23, 2024     Patient: Tru Sy Jr.  YOB: 1960  Date of Visit: 2/23/2024      To Whom it May Concern:    Tru Sy is under my professional care. Tru was seen in my office on 2/23/2024. Tru may return to work on 4/1/24 with no restrictions.    If you have any questions or concerns, please don't hesitate to call.         Sincerely,          Rick Wood MD        CC: No Recipients

## 2024-02-24 NOTE — ASSESSMENT & PLAN NOTE
Patient 6 weeks status post right total knee arthroplasty, doing well     -WBAT  -Activity modification to limit strain or impact on the joint  -Tylenol as needed. Do not exceed 3000mg daily  -Continue supervised physical therapy. Script provided.  Patient may defer this treatment if desired as he has regained significant flexion and extension about the knee on his own.  -Home exercise program directed by PT  -Patient will return for a 3 month visit at the end of March  -Work note provided today for patient to return to work on 4/1/24 without restrictions

## 2024-02-24 NOTE — PROGRESS NOTES
Date: 24  Tru Sy Jr.   MRN# [id number]  : 1960      Chief Complaint: Post op right total knee arthroplasty    Assessment and Plan:    S/P total knee replacement, right  Patient 6 weeks status post right total knee arthroplasty, doing well     -WBAT  -Activity modification to limit strain or impact on the joint  -Tylenol as needed. Do not exceed 3000mg daily  -Continue supervised physical therapy. Script provided.  Patient may defer this treatment if desired as he has regained significant flexion and extension about the knee on his own.  -Home exercise program directed by PT  -Patient will return for a 3 month visit at the end of March  -Work note provided today for patient to return to work on 24 without restrictions       Subjective:     Patient is 6 weeks s/p right primary total  knee arthroplasty.    Date of procedure: 1/15/24  Doing well, no new problems  Pain progressively improving  Improved swelling  Ambulating with no assistive device    Allergy:  Allergies   Allergen Reactions    Statins Myalgia    Other Myalgia     Medications:  all current active meds have been reviewed    Past Medical History:  Past Medical History:   Diagnosis Date    H/O blood clots     pulmonary embolism    PONV (postoperative nausea and vomiting)      Past Surgical History:  Past Surgical History:   Procedure Laterality Date    FOOT SURGERY Right     KNEE SURGERY Right     [place] ARTHRP KNE CONDYLE&PLATU MEDIAL&LAT COMPARTMENTS Right 1/15/2024    Procedure: ARTHROPLASTY KNEE TOTAL, RIGHT SAMEDAY DISCHARGE;  Surgeon: Rick Wood MD;  Location: [facility];  Service: Orthopedics    SHOULDER SURGERY Bilateral      Family History:  Family History   Problem Relation Age of Onset    No Known Problems Mother     No Known Problems Father      Social History:  Social History     Substance and Sexual Activity   Alcohol Use Not Currently     Social History     Substance and Sexual Activity   Drug Use Never  "    Social History     Tobacco Use   Smoking Status Former    Current packs/day: 0.00    Types: Cigarettes    Quit date:     Years since quittin.1    Passive exposure: Never   Smokeless Tobacco Never           Review of Systems:  General- denies fever/chills  HEENT- denies hearing loss or sore throat  Eyes- denies eye pain or visual disturbances, denies red eyes  Respiratory- denies cough or SOB  Cardio- denies chest pain or palpitations  GI- denies abdominal pain  Endocrine- denies urinary frequency  Urinary- denies pain with urination  Musculoskeletal- Negative except noted above  Skin- denies rashes or wounds  Neurological- denies dizziness or headache  Psychiatric- denies anxiety or difficulty concentrating      Objective:   BP Readings from Last 1 Encounters:   24 164/86      Wt Readings from Last 1 Encounters:   24 98.9 kg (218 lb)      Pulse Readings from Last 1 Encounters:   24 69        BMI: Estimated body mass index is 38.62 kg/m² as calculated from the following:    Height as of this encounter: 5' 3\" (1.6 m).    Weight as of this encounter: 98.9 kg (218 lb).      Physical Exam  /86   Pulse 69   Ht 5' 3\" (1.6 m)   Wt 98.9 kg (218 lb)   BMI 38.62 kg/m²   General/Constitutional: No apparent distress: well-nourished and well developed.  Eyes: normal ocular motion  Cardio: RRR, Normal S1S2, No m/r/g.   Lymphatic: No appreciable lymphadenopathy  Respiratory: Non-labored breathing, CTA b/l no w/c/r  Vascular: No edema, swelling or tenderness, except as noted in detailed exam. Extremities well perfused. No LE edema  Integumentary: No impressive skin lesions present, except as noted in detailed exam.  Neuro: No ataxia or tremors noted  Psych: Normal mood and affect, oriented to person, place and time. Appropriate affect.  Musculoskeletal: Normal, except as noted in detailed exam and in HPI.    Gait and Station:   normal    right Knee Examination  Incision: healing " well  Effusion: Minimal  Alignment: normal  AP Stability: stable  Coronal Stability  Extension:stable  Mid-flexion: stable  90 degrees flexion: stable  Range of motion  Active: 0 to 125    Passive: 0 to 125  Extensor lag: Absent  Motor: 5/5 EHL/FHL/TA/GS/QD/HS  Neurovascular exam is intact.   No evidence of calf tightness or posterior cords    Images:    I personally reviewed relevant images in the PACS system and my interpretation is as follows:  X-rays of the right knee reveal a total  knee arthroplasty in appropriate alignment without evidence of migration, wear or loosening.    Scribe Attestation      I,:   am acting as a scribe while in the presence of the attending physician.:       I,:   personally performed the services described in this documentation    as scribed in my presence.:               Rick Wood MD  Adult Reconstruction Specialist   [facility]

## 2024-03-01 ENCOUNTER — TELEPHONE (OUTPATIENT)
Dept: OBGYN CLINIC | Facility: HOSPITAL | Age: 64
End: 2024-03-01

## 2024-03-01 NOTE — TELEPHONE ENCOUNTER
Caller: Abby surespotjalen [employer]    Doctor/Office: Nina/ [place]     CB#: [phone]      What needs to be faxed: Work restrictions letter    ATTN to: [place]    Fax#: [phone]      Documents were successfully e-faxed

## 2024-03-28 ENCOUNTER — OFFICE VISIT (OUTPATIENT)
Dept: OBGYN CLINIC | Facility: CLINIC | Age: 64
End: 2024-03-28

## 2024-03-28 VITALS
HEART RATE: 69 BPM | WEIGHT: 218 LBS | BODY MASS INDEX: 38.62 KG/M2 | HEIGHT: 63 IN | DIASTOLIC BLOOD PRESSURE: 96 MMHG | SYSTOLIC BLOOD PRESSURE: 183 MMHG

## 2024-03-28 DIAGNOSIS — M17.12 PRIMARY OSTEOARTHRITIS OF LEFT KNEE: ICD-10-CM

## 2024-03-28 DIAGNOSIS — Z96.651 S/P TOTAL KNEE REPLACEMENT, RIGHT: Primary | ICD-10-CM

## 2024-03-28 PROCEDURE — 99024 POSTOP FOLLOW-UP VISIT: CPT | Performed by: STUDENT IN AN ORGANIZED HEALTH CARE EDUCATION/TRAINING PROGRAM

## 2024-03-28 NOTE — LETTER
March 28, 2024     Patient: Tru Sy Jr.  YOB: 1960  Date of Visit: 3/28/2024      To Whom it May Concern:    Tru Sy is under my professional care. Tru was seen in my office on 3/28/2024. Tru is cleared to return with no restrictions. We expect to see him one more time for a post operative follow up in ~9 months, 1 year from surgery.     If you have any questions or concerns, please don't hesitate to call.      Sincerely,          Rick Wood MD

## 2024-03-28 NOTE — PROGRESS NOTES
Date: 24  Tru Sy Jr.   MRN# [id number]  : 1960      Chief Complaint: Post op right total knee arthroplasty    Assessment and Plan:    S/P total knee replacement, right  Patient is 10 weeks s/p right total knee arthroplasty     - WBAT RLE   - Continue PT/HEP as directed   - Return to work without restriction. Letter written   - Ice, elevation and OTC pain meds prn   - He is stable and may return to all activities as tolerated. Let pain be a guide.  - F/u in 9 months    Primary osteoarthritis of left knee  Patient has left knee osteoarthritis    - He will RTC in ~4 weeks for additional CSI injection  - Patient is a candidate for a total knee arthroplasty when he is ready           Subjective:   Patient is  10 weeks  s/p right primary total  knee arthroplasty.    Date of procedure: 1/15/24  Doing well, no new problems  Pain progressively improving  Improved swelling  Ambulating with no assistive device    Allergy:  Allergies   Allergen Reactions    Statins Myalgia    Other Myalgia     Medications:  all current active meds have been reviewed    Past Medical History:  Past Medical History:   Diagnosis Date    H/O blood clots     pulmonary embolism    PONV (postoperative nausea and vomiting)      Past Surgical History:  Past Surgical History:   Procedure Laterality Date    FOOT SURGERY Right     KNEE SURGERY Right     [place] ARTHRP KNE CONDYLE&PLATU MEDIAL&LAT COMPARTMENTS Right 1/15/2024    Procedure: ARTHROPLASTY KNEE TOTAL, RIGHT SAMEDAY DISCHARGE;  Surgeon: Rick Wood MD;  Location: [facility];  Service: Orthopedics    SHOULDER SURGERY Bilateral      Family History:  Family History   Problem Relation Age of Onset    No Known Problems Mother     No Known Problems Father      Social History:  Social History     Substance and Sexual Activity   Alcohol Use Not Currently     Social History     Substance and Sexual Activity   Drug Use Never     Social History     Tobacco Use   Smoking  "Status Former    Current packs/day: 0.00    Types: Cigarettes    Quit date:     Years since quittin.2    Passive exposure: Never   Smokeless Tobacco Never           Review of Systems:  General- denies fever/chills  HEENT- denies hearing loss or sore throat  Eyes- denies eye pain or visual disturbances, denies red eyes  Respiratory- denies cough or SOB  Cardio- denies chest pain or palpitations  GI- denies abdominal pain  Endocrine- denies urinary frequency  Urinary- denies pain with urination  Musculoskeletal- Negative except noted above  Skin- denies rashes or wounds  Neurological- denies dizziness or headache  Psychiatric- denies anxiety or difficulty concentrating      Objective:   BP Readings from Last 1 Encounters:   24 (!) 183/96      Wt Readings from Last 1 Encounters:   24 98.9 kg (218 lb)      Pulse Readings from Last 1 Encounters:   24 69        BMI: Estimated body mass index is 38.62 kg/m² as calculated from the following:    Height as of this encounter: 5' 3\" (1.6 m).    Weight as of this encounter: 98.9 kg (218 lb).      Physical Exam  BP (!) 183/96   Pulse 69   Ht 5' 3\" (1.6 m)   Wt 98.9 kg (218 lb)   BMI 38.62 kg/m²   General/Constitutional: No apparent distress: well-nourished and well developed.  Eyes: normal ocular motion  Cardio: RRR, Normal S1S2, No m/r/g.   Lymphatic: No appreciable lymphadenopathy  Respiratory: Non-labored breathing, CTA b/l no w/c/r  Vascular: No edema, swelling or tenderness, except as noted in detailed exam. Extremities well perfused. No LE edema  Integumentary: No impressive skin lesions present, except as noted in detailed exam.  Neuro: No ataxia or tremors noted  Psych: Normal mood and affect, oriented to person, place and time. Appropriate affect.  Musculoskeletal: Normal, except as noted in detailed exam and in HPI.    Gait and Station:   normal    right Knee Examination  Incision: clean, dry, and intact  Effusion: none  Alignment: " normal  AP Stability: stable  Coronal Stability  Extension:stable  Mid-flexion: stable  90 degrees flexion: stable  Range of motion  Active: 0 to 120    Passive: 0 to 120  Extensor lag: Absent  Motor: 5/5 EHL/FHL/TA/GS/QD/HS  Neurovascular exam is intact.   No evidence of calf tightness or posterior cords    Images:  No new imagine       Scribe Attestation      I,:  Ralf Hilliard PA-C am acting as a scribe while in the presence of the attending physician.:       I,:  Rick Wood MD personally performed the services described in this documentation    as scribed in my presence.:               Rick Wood MD  Adult Reconstruction Specialist   [facility]

## 2024-03-28 NOTE — ASSESSMENT & PLAN NOTE
Patient has left knee osteoarthritis    - He will RTC in ~4 weeks for additional CSI injection  - Patient is a candidate for a total knee arthroplasty when he is ready

## 2024-03-28 NOTE — ASSESSMENT & PLAN NOTE
Patient is 10 weeks s/p right total knee arthroplasty     - WBAT RLE   - Continue PT/HEP as directed   - Return to work without restriction. Letter written   - Ice, elevation and OTC pain meds prn   - He is stable and may return to all activities as tolerated. Let pain be a guide.  - F/u in 9 months

## 2024-07-09 ENCOUNTER — HOSPITAL ENCOUNTER (EMERGENCY)
Facility: HOSPITAL | Age: 64
Discharge: HOME | End: 2024-07-09
Attending: EMERGENCY MEDICINE | Admitting: EMERGENCY MEDICINE
Payer: COMMERCIAL

## 2024-07-09 VITALS
SYSTOLIC BLOOD PRESSURE: 150 MMHG | OXYGEN SATURATION: 97 % | RESPIRATION RATE: 18 BRPM | HEART RATE: 60 BPM | TEMPERATURE: 98.3 F | DIASTOLIC BLOOD PRESSURE: 72 MMHG

## 2024-07-09 DIAGNOSIS — L03.116 CELLULITIS OF LEFT LEG: ICD-10-CM

## 2024-07-09 DIAGNOSIS — S80.812A ABRASION OF LEFT LEG, INITIAL ENCOUNTER: Primary | ICD-10-CM

## 2024-07-09 LAB
ANION GAP SERPL CALCULATED.3IONS-SCNC: 6 MMOL/L (ref 4–13)
APTT PPP: 40 SECONDS (ref 23–37)
BASOPHILS # BLD AUTO: 0.04 THOUSANDS/ÂΜL (ref 0–0.1)
BASOPHILS NFR BLD AUTO: 0 % (ref 0–1)
BUN SERPL-MCNC: 17 MG/DL (ref 5–25)
CALCIUM SERPL-MCNC: 9.5 MG/DL (ref 8.4–10.2)
CHLORIDE SERPL-SCNC: 101 MMOL/L (ref 96–108)
CO2 SERPL-SCNC: 30 MMOL/L (ref 21–32)
CREAT SERPL-MCNC: 0.94 MG/DL (ref 0.6–1.3)
D DIMER PPP FEU-MCNC: 0.43 UG/ML FEU
EOSINOPHIL # BLD AUTO: 0.38 THOUSAND/ÂΜL (ref 0–0.61)
EOSINOPHIL NFR BLD AUTO: 3 % (ref 0–6)
ERYTHROCYTE [DISTWIDTH] IN BLOOD BY AUTOMATED COUNT: 15 % (ref 11.6–15.1)
GFR SERPL CREATININE-BSD FRML MDRD: 85 ML/MIN/1.73SQ M
GLUCOSE SERPL-MCNC: 137 MG/DL (ref 65–140)
HCT VFR BLD AUTO: 42 % (ref 36.5–49.3)
HGB BLD-MCNC: 13.6 G/DL (ref 12–17)
IMM GRANULOCYTES # BLD AUTO: 0.07 THOUSAND/UL (ref 0–0.2)
IMM GRANULOCYTES NFR BLD AUTO: 1 % (ref 0–2)
INR PPP: 2.9 (ref 0.84–1.19)
LYMPHOCYTES # BLD AUTO: 1.51 THOUSANDS/ÂΜL (ref 0.6–4.47)
LYMPHOCYTES NFR BLD AUTO: 10 % (ref 14–44)
MCH RBC QN AUTO: 27.8 PG (ref 26.8–34.3)
MCHC RBC AUTO-ENTMCNC: 32.4 G/DL (ref 31.4–37.4)
MCV RBC AUTO: 86 FL (ref 82–98)
MONOCYTES # BLD AUTO: 1.78 THOUSAND/ÂΜL (ref 0.17–1.22)
MONOCYTES NFR BLD AUTO: 12 % (ref 4–12)
NEUTROPHILS # BLD AUTO: 11.56 THOUSANDS/ÂΜL (ref 1.85–7.62)
NEUTS SEG NFR BLD AUTO: 74 % (ref 43–75)
NRBC BLD AUTO-RTO: 0 /100 WBCS
PLATELET # BLD AUTO: 195 THOUSANDS/UL (ref 149–390)
PMV BLD AUTO: 11.5 FL (ref 8.9–12.7)
POTASSIUM SERPL-SCNC: 3.8 MMOL/L (ref 3.5–5.3)
PROTHROMBIN TIME: 30.9 SECONDS (ref 11.6–14.5)
RBC # BLD AUTO: 4.9 MILLION/UL (ref 3.88–5.62)
SODIUM SERPL-SCNC: 137 MMOL/L (ref 135–147)
WBC # BLD AUTO: 15.34 THOUSAND/UL (ref 4.31–10.16)

## 2024-07-09 PROCEDURE — 85730 THROMBOPLASTIN TIME PARTIAL: CPT | Performed by: EMERGENCY MEDICINE

## 2024-07-09 PROCEDURE — 85379 FIBRIN DEGRADATION QUANT: CPT | Performed by: EMERGENCY MEDICINE

## 2024-07-09 PROCEDURE — 90715 TDAP VACCINE 7 YRS/> IM: CPT | Performed by: EMERGENCY MEDICINE

## 2024-07-09 PROCEDURE — 99284 EMERGENCY DEPT VISIT MOD MDM: CPT | Performed by: EMERGENCY MEDICINE

## 2024-07-09 PROCEDURE — 85610 PROTHROMBIN TIME: CPT | Performed by: EMERGENCY MEDICINE

## 2024-07-09 PROCEDURE — 90471 IMMUNIZATION ADMIN: CPT

## 2024-07-09 PROCEDURE — 85025 COMPLETE CBC W/AUTO DIFF WBC: CPT | Performed by: EMERGENCY MEDICINE

## 2024-07-09 PROCEDURE — 99283 EMERGENCY DEPT VISIT LOW MDM: CPT

## 2024-07-09 PROCEDURE — 36415 COLL VENOUS BLD VENIPUNCTURE: CPT | Performed by: EMERGENCY MEDICINE

## 2024-07-09 PROCEDURE — 80048 BASIC METABOLIC PNL TOTAL CA: CPT | Performed by: EMERGENCY MEDICINE

## 2024-07-09 RX ORDER — GINSENG 100 MG
1 CAPSULE ORAL ONCE
Status: COMPLETED | OUTPATIENT
Start: 2024-07-09 | End: 2024-07-09

## 2024-07-09 RX ORDER — CEPHALEXIN 500 MG/1
500 CAPSULE ORAL EVERY 6 HOURS SCHEDULED
Qty: 28 CAPSULE | Refills: 0 | Status: SHIPPED | OUTPATIENT
Start: 2024-07-09 | End: 2024-07-17

## 2024-07-09 RX ADMIN — BACITRACIN 1 SMALL APPLICATION: 500 OINTMENT TOPICAL at 07:36

## 2024-07-09 RX ADMIN — TETANUS TOXOID, REDUCED DIPHTHERIA TOXOID AND ACELLULAR PERTUSSIS VACCINE, ADSORBED 0.5 ML: 5; 2.5; 8; 8; 2.5 SUSPENSION INTRAMUSCULAR at 07:34

## 2024-07-09 NOTE — ED PROVIDER NOTES
"History  Chief Complaint   Patient presents with    Leg Pain     Patient reports to ED left lower leg pain x3 days. Hx of DVT and PE. Redness noted to posterior calf. Reports trouble ambulating. Reports chainsaw went into anterior calf last Thursday, patient states \"I am unsure if that is related\".      History from patient and medical records past medical history PE DVT factor V Leiden and takes Coumadin recent right knee replacement presents with concern for left calf swelling bruising and pain.  6 days ago at work he accidentally dropped a chainsaw on his leg and had some abrasions to the shin.  Those are not bothering him but then a couple days ago he started to get pain in the back of his leg at the calf and this bruising he thinks it might be a blood clot he had trouble sleeping last night due to the pain.  He did not take any specific medicine for the symptoms.        Prior to Admission Medications   Prescriptions Last Dose Informant Patient Reported? Taking?   celecoxib (CeleBREX) 200 mg capsule  Self No No   Sig: Take 1 capsule (200 mg total) by mouth 2 (two) times a day   docusate sodium (COLACE) 100 mg capsule  Self No No   Sig: Take 1 capsule (100 mg total) by mouth 2 (two) times a day   hydrochlorothiazide (HYDRODIURIL) 25 mg tablet  Self Yes No   Sig: Take 25 mg by mouth daily   oxyCODONE (Roxicodone) 5 immediate release tablet  Self No No   Sig: Take 1 tablet (5 mg total) by mouth every 4 (four) hours as needed for severe pain for up to 15 doses Max Daily Amount: 30 mg   Patient not taking: Reported on 2/23/2024   warfarin (COUMADIN) 5 mg tablet  Self Yes No   Sig: Take 5 mg by mouth 7.5mg 3xweek, 5mg 4xweek      Facility-Administered Medications: None       Past Medical History:   Diagnosis Date    Factor V Leiden (HCC)     H/O blood clots     pulmonary embolism    PONV (postoperative nausea and vomiting)        Past Surgical History:   Procedure Laterality Date    FOOT SURGERY Right     KNEE SURGERY " Right     AL ARTHRP KNE CONDYLE&PLATU MEDIAL&LAT COMPARTMENTS Right 1/15/2024    Procedure: ARTHROPLASTY KNEE TOTAL, RIGHT SAMEDAY DISCHARGE;  Surgeon: Rick Wood MD;  Location: [facility];  Service: Orthopedics    SHOULDER SURGERY Bilateral        Family History   Problem Relation Age of Onset    No Known Problems Mother     No Known Problems Father      I have reviewed and agree with the history as documented.    E-Cigarette/Vaping    E-Cigarette Use Never User      E-Cigarette/Vaping Substances    Nicotine No     THC No     CBD No     Flavoring No     Other No     Unknown No      Social History     Tobacco Use    Smoking status: Former     Current packs/day: 0.00     Types: Cigarettes     Quit date:      Years since quittin.5     Passive exposure: Never    Smokeless tobacco: Never   Vaping Use    Vaping status: Never Used   Substance Use Topics    Alcohol use: Not Currently    Drug use: Never       Review of Systems   Constitutional:  Negative for chills and fever.   HENT:  Negative for rhinorrhea and sore throat.    Respiratory:  Negative for shortness of breath.    Cardiovascular:  Negative for chest pain.   Gastrointestinal:  Negative for constipation, diarrhea, nausea and vomiting.   Genitourinary:  Negative for dysuria and frequency.   Skin:  Positive for rash and wound.   All other systems reviewed and are negative.      Physical Exam  Physical Exam  Vitals and nursing note reviewed.   Constitutional:       Appearance: He is well-developed. He is obese.   HENT:      Head: Normocephalic and atraumatic.      Right Ear: External ear normal.      Left Ear: External ear normal.      Nose: Nose normal.   Eyes:      Conjunctiva/sclera: Conjunctivae normal.      Pupils: Pupils are equal, round, and reactive to light.   Cardiovascular:      Rate and Rhythm: Normal rate and regular rhythm.      Heart sounds: Normal heart sounds.   Pulmonary:      Effort: Pulmonary effort is normal. No respiratory  distress.      Breath sounds: Normal breath sounds. No wheezing.   Abdominal:      General: Bowel sounds are normal. There is no distension.      Palpations: Abdomen is soft.      Tenderness: There is no abdominal tenderness.   Musculoskeletal:         General: Swelling, tenderness and signs of injury present. No deformity. Normal range of motion.      Cervical back: Normal range of motion and neck supple. No spinous process tenderness.      Left lower leg: Swelling and tenderness present.      Comments: See media - anterior left lower leg abrasions, no major erythema or drainage but posterior left lower leg erythema, ecchymosis, tender    No crepitus or discharge/ drainage  NV intact   Skin:     General: Skin is warm and dry.      Findings: No rash.   Neurological:      General: No focal deficit present.      Mental Status: He is alert.      GCS: GCS eye subscore is 4. GCS verbal subscore is 5. GCS motor subscore is 6.      Sensory: No sensory deficit.   Psychiatric:         Mood and Affect: Mood normal.               Vital Signs  ED Triage Vitals [07/09/24 0647]   Temperature Pulse Respirations Blood Pressure SpO2   98.3 °F (36.8 °C) 77 18 (!) 182/75 98 %      Temp Source Heart Rate Source Patient Position - Orthostatic VS BP Location FiO2 (%)   Oral Monitor Sitting Right arm --      Pain Score       5           Vitals:    07/09/24 0700 07/09/24 0730 07/09/24 0800 07/09/24 0830   BP: 149/67 139/62 148/73 150/72   Pulse: 64 64 62 60   Patient Position - Orthostatic VS:             Visual Acuity      ED Medications  Medications   tetanus-diphtheria-acellular pertussis (BOOSTRIX) IM injection 0.5 mL (0.5 mL Intramuscular Given 7/9/24 0734)   bacitracin topical ointment 1 small application (1 small application Topical Given 7/9/24 0736)       Diagnostic Studies  Results Reviewed       Procedure Component Value Units Date/Time    Basic metabolic panel [[id number]] Collected: 07/09/24 0736    Lab Status: Final result  Specimen: Blood from Arm, Left Updated: 07/09/24 0803     Sodium 137 mmol/L      Potassium 3.8 mmol/L      Chloride 101 mmol/L      CO2 30 mmol/L      ANION GAP 6 mmol/L      BUN 17 mg/dL      Creatinine 0.94 mg/dL      Glucose 137 mg/dL      Calcium 9.5 mg/dL      eGFR 85 ml/min/1.73sq m     Narrative:      National Kid[employer] guidelines for Chronic Kidney Disease (CKD):     Stage 1 with normal or high GFR (GFR > 90 mL/min/1.73 square meters)    Stage 2 Mild CKD (GFR = 60-89 mL/min/1.73 square meters)    Stage 3A Moderate CKD (GFR = 45-59 mL/min/1.73 square meters)    Stage 3B Moderate CKD (GFR = 30-44 mL/min/1.73 square meters)    Stage 4 Severe CKD (GFR = 15-29 mL/min/1.73 square meters)    Stage 5 End Stage CKD (GFR <15 mL/min/1.73 square meters)  Note: GFR calculation is accurate only with a steady state creatinine    D-Dimer [[id number]]  (Normal) Collected: 07/09/24 0736    Lab Status: Final result Specimen: Blood from Arm, Left Updated: 07/09/24 0800     D-Dimer, Quant 0.43 ug/ml FEU     Narrative:      In the evaluation for possible pulmonary embolism, in the appropriate (Well's Score of 4 or less) patient, the age adjusted d-dimer cutoff for this patient can be calculated as:    Age x 0.01 (in ug/mL) for Age-adjusted D-dimer exclusion threshold for a patient over 50 years.    Protime-INR [[id number]]  (Abnormal) Collected: 07/09/24 0736    Lab Status: Final result Specimen: Blood from Arm, Left Updated: 07/09/24 0758     Protime 30.9 seconds      INR 2.90    APTT [[id number]]  (Abnormal) Collected: 07/09/24 0736    Lab Status: Final result Specimen: Blood from Arm, Left Updated: 07/09/24 0758     PTT 40 seconds     CBC and differential [[id number]]  (Abnormal) Collected: 07/09/24 0736    Lab Status: Final result Specimen: Blood from Arm, Left Updated: 07/09/24 0743     WBC 15.34 Thousand/uL      RBC 4.90 Million/uL      Hemoglobin 13.6 g/dL      Hematocrit 42.0 %      MCV 86 fL      MCH 27.8 pg       MCHC 32.4 g/dL      RDW 15.0 %      MPV 11.5 fL      Platelets 195 Thousands/uL      nRBC 0 /100 WBCs      Segmented % 74 %      Immature Grans % 1 %      Lymphocytes % 10 %      Monocytes % 12 %      Eosinophils Relative 3 %      Basophils Relative 0 %      Absolute Neutrophils 11.56 Thousands/µL      Absolute Immature Grans 0.07 Thousand/uL      Absolute Lymphocytes 1.51 Thousands/µL      Absolute Monocytes 1.78 Thousand/µL      Eosinophils Absolute 0.38 Thousand/µL      Basophils Absolute 0.04 Thousands/µL                    No orders to display              Procedures  Procedures         ED Course                               SBIRT 20yo+      Flowsheet Row Most Recent Value   Initial Alcohol Screen: US AUDIT-C     1. How often do you have a drink containing alcohol? 0 Filed at: 07/09/2024 0647   2. How many drinks containing alcohol do you have on a typical day you are drinking?  0 Filed at: 07/09/2024 0647   3a. Male UNDER 65: How often do you have five or more drinks on one occasion? 0 Filed at: 07/09/2024 0647   3b. FEMALE Any Age, or MALE 65+: How often do you have 4 or more drinks on one occassion? 0 Filed at: 07/09/2024 0647   Audit-C Score 0 Filed at: 07/09/2024 0647   FREDERICK: How many times in the past year have you...    Used an illegal drug or used a prescription medication for non-medical reasons? Never Filed at: 07/09/2024 0647                      Medical Decision Making  Diff includes dvt, cellulitis infected wound, abrasions, bruising, superficial thrombophlebitis, less likely gas gangrene, abscess, sepsis    Amount and/or Complexity of Data Reviewed  Labs: ordered.    Risk  OTC drugs.  Prescription drug management.             Disposition  Final diagnoses:   Abrasion of left leg, initial encounter   Cellulitis of left leg     Time reflects when diagnosis was documented in both MDM as applicable and the Disposition within this note       Time User Action Codes Description Comment    7/9/2024   8:35 AM Jean Paul Reyes [S80.812A] Abrasion of left leg, initial encounter     7/9/2024  8:35 AM Jean Paul Reyes [L03.116] Cellulitis of left leg           ED Disposition       ED Disposition   Discharge    Condition   Stable    Date/Time   Tue Jul 9, 2024 0835    Comment   Tru Sy Jr. discharge to home/self care.                   Follow-up Information    None         Discharge Medication List as of 7/9/2024  8:37 AM        START taking these medications    Details   cephalexin (KEFLEX) 500 mg capsule Take 1 capsule (500 mg total) by mouth every 6 (six) hours for 7 days, Starting Tue 7/9/2024, Until Tue 7/16/2024, Normal           CONTINUE these medications which have NOT CHANGED    Details   celecoxib (CeleBREX) 200 mg capsule Take 1 capsule (200 mg total) by mouth 2 (two) times a day, Starting Mon 1/15/2024, Until Wed 2/14/2024, Normal      docusate sodium (COLACE) 100 mg capsule Take 1 capsule (100 mg total) by mouth 2 (two) times a day, Starting Mon 1/15/2024, Until Wed 2/14/2024, Normal      hydrochlorothiazide (HYDRODIURIL) 25 mg tablet Take 25 mg by mouth daily, Starting u 8/24/2023, Historical Med      oxyCODONE (Roxicodone) 5 immediate release tablet Take 1 tablet (5 mg total) by mouth every 4 (four) hours as needed for severe pain for up to 15 doses Max Daily Amount: 30 mg, Starting Mon 1/22/2024, Normal      warfarin (COUMADIN) 5 mg tablet Take 5 mg by mouth 7.5mg 3xweek, 5mg 4xweek, Historical Med             No discharge procedures on file.    PDMP Review         Value Time User    PDMP Reviewed  Yes 1/15/2024  8:15 AM Lina Cee PA-C            ED Provider  Electronically Signed by             Jean Paul Reyes DO  07/09/24 8420

## 2024-07-15 ENCOUNTER — HOSPITAL ENCOUNTER (INPATIENT)
Facility: HOSPITAL | Age: 64
LOS: 2 days | Discharge: HOME | DRG: 603 | End: 2024-07-17
Attending: EMERGENCY MEDICINE | Admitting: HOSPITALIST
Payer: COMMERCIAL

## 2024-07-15 DIAGNOSIS — L03.116 LEFT LEG CELLULITIS: ICD-10-CM

## 2024-07-15 DIAGNOSIS — L03.116 CELLULITIS OF LEFT LEG: Primary | ICD-10-CM

## 2024-07-15 LAB
ALBUMIN SERPL BCG-MCNC: 3.9 G/DL (ref 3.5–5)
ALP SERPL-CCNC: 74 U/L (ref 34–104)
ALT SERPL W P-5'-P-CCNC: 26 U/L (ref 7–52)
ANION GAP SERPL CALCULATED.3IONS-SCNC: 8 MMOL/L (ref 4–13)
APTT PPP: 36 SECONDS (ref 23–37)
AST SERPL W P-5'-P-CCNC: 24 U/L (ref 13–39)
ATRIAL RATE: 66 BPM
BASOPHILS # BLD AUTO: 0.06 THOUSANDS/ÂΜL (ref 0–0.1)
BASOPHILS NFR BLD AUTO: 1 % (ref 0–1)
BILIRUB SERPL-MCNC: 0.63 MG/DL (ref 0.2–1)
BUN SERPL-MCNC: 23 MG/DL (ref 5–25)
CALCIUM SERPL-MCNC: 9.5 MG/DL (ref 8.4–10.2)
CHLORIDE SERPL-SCNC: 99 MMOL/L (ref 96–108)
CO2 SERPL-SCNC: 29 MMOL/L (ref 21–32)
CREAT SERPL-MCNC: 0.91 MG/DL (ref 0.6–1.3)
EOSINOPHIL # BLD AUTO: 0.5 THOUSAND/ÂΜL (ref 0–0.61)
EOSINOPHIL NFR BLD AUTO: 4 % (ref 0–6)
ERYTHROCYTE [DISTWIDTH] IN BLOOD BY AUTOMATED COUNT: 14.3 % (ref 11.6–15.1)
GFR SERPL CREATININE-BSD FRML MDRD: 88 ML/MIN/1.73SQ M
GLUCOSE SERPL-MCNC: 114 MG/DL (ref 65–140)
HCT VFR BLD AUTO: 43.6 % (ref 36.5–49.3)
HGB BLD-MCNC: 14 G/DL (ref 12–17)
IMM GRANULOCYTES # BLD AUTO: 0.1 THOUSAND/UL (ref 0–0.2)
IMM GRANULOCYTES NFR BLD AUTO: 1 % (ref 0–2)
INR PPP: 2.03 (ref 0.84–1.19)
LACTATE SERPL-SCNC: 1 MMOL/L (ref 0.5–2)
LYMPHOCYTES # BLD AUTO: 2.12 THOUSANDS/ÂΜL (ref 0.6–4.47)
LYMPHOCYTES NFR BLD AUTO: 18 % (ref 14–44)
MCH RBC QN AUTO: 27.5 PG (ref 26.8–34.3)
MCHC RBC AUTO-ENTMCNC: 32.1 G/DL (ref 31.4–37.4)
MCV RBC AUTO: 86 FL (ref 82–98)
MONOCYTES # BLD AUTO: 0.93 THOUSAND/ÂΜL (ref 0.17–1.22)
MONOCYTES NFR BLD AUTO: 8 % (ref 4–12)
NEUTROPHILS # BLD AUTO: 7.89 THOUSANDS/ÂΜL (ref 1.85–7.62)
NEUTS SEG NFR BLD AUTO: 68 % (ref 43–75)
NRBC BLD AUTO-RTO: 0 /100 WBCS
P AXIS: 65 DEGREES
PLATELET # BLD AUTO: 239 THOUSANDS/UL (ref 149–390)
PMV BLD AUTO: 10.9 FL (ref 8.9–12.7)
POTASSIUM SERPL-SCNC: 3.9 MMOL/L (ref 3.5–5.3)
PR INTERVAL: 122 MS
PROCALCITONIN SERPL-MCNC: 0.06 NG/ML
PROT SERPL-MCNC: 7.5 G/DL (ref 6.4–8.4)
PROTHROMBIN TIME: 23.4 SECONDS (ref 11.6–14.5)
QRS AXIS: 69 DEGREES
QRSD INTERVAL: 126 MS
QT INTERVAL: 444 MS
QTC INTERVAL: 465 MS
RBC # BLD AUTO: 5.1 MILLION/UL (ref 3.88–5.62)
SODIUM SERPL-SCNC: 136 MMOL/L (ref 135–147)
T WAVE AXIS: 32 DEGREES
VENTRICULAR RATE: 66 BPM
WBC # BLD AUTO: 11.6 THOUSAND/UL (ref 4.31–10.16)

## 2024-07-15 PROCEDURE — 93010 ELECTROCARDIOGRAM REPORT: CPT | Performed by: INTERNAL MEDICINE

## 2024-07-15 PROCEDURE — 99284 EMERGENCY DEPT VISIT MOD MDM: CPT

## 2024-07-15 PROCEDURE — 85730 THROMBOPLASTIN TIME PARTIAL: CPT | Performed by: EMERGENCY MEDICINE

## 2024-07-15 PROCEDURE — 93005 ELECTROCARDIOGRAM TRACING: CPT

## 2024-07-15 PROCEDURE — 83605 ASSAY OF LACTIC ACID: CPT | Performed by: EMERGENCY MEDICINE

## 2024-07-15 PROCEDURE — 80053 COMPREHEN METABOLIC PANEL: CPT | Performed by: EMERGENCY MEDICINE

## 2024-07-15 PROCEDURE — 99223 1ST HOSP IP/OBS HIGH 75: CPT | Performed by: INTERNAL MEDICINE

## 2024-07-15 PROCEDURE — 36415 COLL VENOUS BLD VENIPUNCTURE: CPT

## 2024-07-15 PROCEDURE — 99285 EMERGENCY DEPT VISIT HI MDM: CPT

## 2024-07-15 PROCEDURE — 87040 BLOOD CULTURE FOR BACTERIA: CPT | Performed by: EMERGENCY MEDICINE

## 2024-07-15 PROCEDURE — 85610 PROTHROMBIN TIME: CPT | Performed by: EMERGENCY MEDICINE

## 2024-07-15 PROCEDURE — 84145 PROCALCITONIN (PCT): CPT | Performed by: EMERGENCY MEDICINE

## 2024-07-15 PROCEDURE — 85025 COMPLETE CBC W/AUTO DIFF WBC: CPT | Performed by: EMERGENCY MEDICINE

## 2024-07-15 RX ORDER — HYDROCHLOROTHIAZIDE 25 MG/1
25 TABLET ORAL DAILY
Status: DISCONTINUED | OUTPATIENT
Start: 2024-07-16 | End: 2024-07-17 | Stop reason: HOSPADM

## 2024-07-15 RX ORDER — CEFEPIME HYDROCHLORIDE 2 G/50ML
2000 INJECTION, SOLUTION INTRAVENOUS ONCE
Status: COMPLETED | OUTPATIENT
Start: 2024-07-15 | End: 2024-07-15

## 2024-07-15 RX ORDER — WARFARIN SODIUM 5 MG/1
5 TABLET ORAL
Status: DISCONTINUED | OUTPATIENT
Start: 2024-07-16 | End: 2024-07-17 | Stop reason: HOSPADM

## 2024-07-15 RX ORDER — ACETAMINOPHEN 325 MG/1
650 TABLET ORAL EVERY 6 HOURS PRN
Status: DISCONTINUED | OUTPATIENT
Start: 2024-07-15 | End: 2024-07-17 | Stop reason: HOSPADM

## 2024-07-15 RX ORDER — CEFEPIME HYDROCHLORIDE 2 G/50ML
2000 INJECTION, SOLUTION INTRAVENOUS EVERY 12 HOURS
Status: DISCONTINUED | OUTPATIENT
Start: 2024-07-15 | End: 2024-07-16

## 2024-07-15 RX ORDER — CEFTRIAXONE 1 G/50ML
1000 INJECTION, SOLUTION INTRAVENOUS EVERY 24 HOURS
Status: DISCONTINUED | OUTPATIENT
Start: 2024-07-15 | End: 2024-07-15

## 2024-07-15 RX ORDER — VANCOMYCIN HYDROCHLORIDE 1 G/200ML
1000 INJECTION, SOLUTION INTRAVENOUS EVERY 12 HOURS
Status: DISCONTINUED | OUTPATIENT
Start: 2024-07-15 | End: 2024-07-17 | Stop reason: HOSPADM

## 2024-07-15 RX ADMIN — CEFEPIME HYDROCHLORIDE 2000 MG: 2 INJECTION, SOLUTION INTRAVENOUS at 13:29

## 2024-07-15 RX ADMIN — VANCOMYCIN HYDROCHLORIDE 1000 MG: 1 INJECTION, SOLUTION INTRAVENOUS at 19:10

## 2024-07-15 RX ADMIN — VANCOMYCIN HYDROCHLORIDE 1250 MG: 5 INJECTION, POWDER, LYOPHILIZED, FOR SOLUTION INTRAVENOUS at 13:41

## 2024-07-15 RX ADMIN — CEFEPIME HYDROCHLORIDE 2000 MG: 2 INJECTION, SOLUTION INTRAVENOUS at 20:34

## 2024-07-15 RX ADMIN — WARFARIN SODIUM 7.5 MG: 5 TABLET ORAL at 17:45

## 2024-07-15 NOTE — WOUND OSTOMY CARE
Consult Note - Wound   Tru Sy Jr. 63 y.o. male MRN: [id number]  Unit/Bed#: -01 Encounter: [id number]      History and Present Illness:  63 year old  male admitted with left leg cellulitis PMH of benign essential hypertension,pulmonary embolism. Chronic anticoagulation,prediabetes,obesity history of blood clots total right knee replacement     Assessment Findings:   Wound to left leg after dropping a chain saw on his left leg. Had po Keflex and Tetanus on 7/9/24.  Alert oriented, ambulatory no other skin issues   Wound care consulted for traumatic wound to posterior left tibia   Wound bed is partial thickness with pink wound bed, serous drainage while cleansing. Pt stated wound had been draining Periwound edema and erythema noted.    Plan:     Ambulatory referral placed to wound care   Cleanse left posterior tibial wounds with NSS apply adaptic non adherent then Melgisorb AG to wound and cover with Mepilex bordered foam dressing. Change daily and prn soil or dislodgment      Call or tigertext with any questions  Wound Care will continue to follow weekly    Caitlin DONALDSONN RN [facility]

## 2024-07-15 NOTE — ASSESSMENT & PLAN NOTE
On Coumadin 7.5 mg 3 times a week and 5 mg rest of the days   Patient cannot remember all the days with 7.5 mg however he remembers Sunday and Monday  Continue 7.5 mg today, 5 mg starting tomorrow  INR between 2 and 3

## 2024-07-15 NOTE — ED PROVIDER NOTES
History  Chief Complaint   Patient presents with    Cellulitis     Pt with recent chainsaw injury to left lower extremity. Pt with swelling and redness and painful left calf today     Tru is a 63-year-old male with significant past medical history of factor V Leiden and PE on warfarin presenting to the ED for left leg cellulitis s/p chainsaw injury on 7/3.  He was diagnosed with cellulitis and treated with Keflex x 7 days on 7/9.  He reports medication compliance with only one remaining dose. His friends and family feel that the leg appears worse and encouraged him to be reevaluated today.  He reports associated L leg swelling, redness, open blisters, and purulent drainage from the posterior calf. He is actively working as a contractor.  He reports not keeping the area covered while at work. He denies fevers, chills, diaphoresis, nausea, vomiting, chest pain, shortness of breath, or rash extending into the proximal leg. No recent hospitalizations. Tetanus booster administered at initial ED visit.      History provided by:  Patient   used: No        Prior to Admission Medications   Prescriptions Last Dose Informant Patient Reported? Taking?   cephalexin (KEFLEX) 500 mg capsule 7/15/2024  No Yes   Sig: Take 1 capsule (500 mg total) by mouth every 6 (six) hours for 7 days   docusate sodium (COLACE) 100 mg capsule  Self No No   Sig: Take 1 capsule (100 mg total) by mouth 2 (two) times a day   hydrochlorothiazide (HYDRODIURIL) 25 mg tablet 7/15/2024 Self Yes Yes   Sig: Take 25 mg by mouth daily   warfarin (COUMADIN) 5 mg tablet 7/14/2024 Self Yes Yes   Sig: Take 5 mg by mouth 7.5mg 3xweek, 5mg 4xweek      Facility-Administered Medications: None       Past Medical History:   Diagnosis Date    Factor V Leiden (HCC)     H/O blood clots     pulmonary embolism    PONV (postoperative nausea and vomiting)        Past Surgical History:   Procedure Laterality Date    FOOT SURGERY Right     KNEE SURGERY  Right     [place] ARTHRP KNE CONDYLE&PLATU MEDIAL&LAT COMPARTMENTS Right 1/15/2024    Procedure: ARTHROPLASTY KNEE TOTAL, RIGHT SAMEDAY DISCHARGE;  Surgeon: Rick Wood MD;  Location: [facility];  Service: Orthopedics    SHOULDER SURGERY Bilateral        Family History   Problem Relation Age of Onset    No Known Problems Mother     No Known Problems Father      I have reviewed and agree with the history as documented.    E-Cigarette/Vaping    E-Cigarette Use Never User      E-Cigarette/Vaping Substances    Nicotine No     THC No     CBD No     Flavoring No     Other No     Unknown No      Social History     Tobacco Use    Smoking status: Former     Current packs/day: 0.00     Types: Cigarettes     Quit date:      Years since quittin.5     Passive exposure: Never    Smokeless tobacco: Never   Vaping Use    Vaping status: Never Used   Substance Use Topics    Alcohol use: Not Currently    Drug use: Never       Review of Systems   Constitutional:  Negative for chills, diaphoresis and fatigue.   HENT:  Negative for congestion and sore throat.    Respiratory:  Negative for cough and shortness of breath.    Cardiovascular:  Positive for leg swelling (R leg). Negative for chest pain.   Gastrointestinal:  Negative for abdominal pain, nausea and vomiting.   Genitourinary:  Negative for difficulty urinating, dysuria and hematuria.   Musculoskeletal:  Negative for back pain, neck pain and neck stiffness.   Skin:  Positive for rash and wound. Negative for pallor.   Neurological:  Negative for syncope, weakness, numbness and headaches.   All other systems reviewed and are negative.      Physical Exam  Physical Exam  Vitals and nursing note reviewed.   Constitutional:       General: He is not in acute distress.     Appearance: Normal appearance. He is not ill-appearing, toxic-appearing or diaphoretic.   HENT:      Head: Normocephalic and atraumatic.      Mouth/Throat:      Mouth: Mucous membranes are moist.       Pharynx: Oropharynx is clear.   Eyes:      Conjunctiva/sclera: Conjunctivae normal.   Cardiovascular:      Rate and Rhythm: Normal rate and regular rhythm.      Pulses: Normal pulses.      Heart sounds: Normal heart sounds. No murmur heard.     No friction rub. No gallop.   Pulmonary:      Effort: No respiratory distress.      Breath sounds: Normal breath sounds. No wheezing, rhonchi or rales.   Abdominal:      General: Abdomen is flat.      Palpations: Abdomen is soft.      Tenderness: There is no abdominal tenderness. There is no guarding or rebound.   Musculoskeletal:         General: Signs of injury (multiple healing lacerations on LLE) present. No deformity. Normal range of motion.      Cervical back: Normal range of motion and neck supple.      Right lower leg: No edema.      Left lower leg: Swelling and tenderness (LLE) present. 2+ Edema present.   Skin:     General: Skin is warm and dry.      Capillary Refill: Capillary refill takes less than 2 seconds.      Coloration: Skin is not pale.      Findings: Erythema (LLE) present.   Neurological:      General: No focal deficit present.      Mental Status: He is alert.      Sensory: No sensory deficit.      Motor: No weakness.                     Vital Signs  ED Triage Vitals [07/15/24 1120]   Temperature Pulse Respirations Blood Pressure SpO2   98.1 °F (36.7 °C) 65 20 146/71 98 %      Temp Source Heart Rate Source Patient Position - Orthostatic VS BP Location FiO2 (%)   Temporal Monitor Sitting Left arm --      Pain Score       5           Vitals:    07/15/24 1120 07/15/24 1329 07/15/24 1355 07/15/24 1357   BP: 146/71 137/72 169/83 169/83   Pulse: 65 68 63 62   Patient Position - Orthostatic VS: Sitting            Visual Acuity      ED Medications  Medications   hydroCHLOROthiazide tablet 25 mg (has no administration in time range)   warfarin (COUMADIN) tablet 7.5 mg (has no administration in time range)   warfarin (COUMADIN) tablet 5 mg (has no administration in  time range)   acetaminophen (TYLENOL) tablet 650 mg (has no administration in time range)   cefepime (MAXIPIME) IVPB (premix in dextrose) 2,000 mg 50 mL (has no administration in time range)   vancomycin (VANCOCIN) IVPB (premix in dextrose) 1,000 mg 200 mL (has no administration in time range)   cefepime (MAXIPIME) IVPB (premix in dextrose) 2,000 mg 50 mL (2,000 mg Intravenous New Bag 7/15/24 1329)   vancomycin (VANCOCIN) 1250 mg in sodium chloride 0.9% 250 mL IVPB (1,250 mg Intravenous New Bag 7/15/24 1341)       Diagnostic Studies  Results Reviewed       Procedure Component Value Units Date/Time    Blood culture #2 [[id number]] Collected: 07/15/24 1333    Lab Status: In process Specimen: Blood from Line, Venous Updated: 07/15/24 1353    Comprehensive metabolic panel [[id number]] Collected: 07/15/24 1131    Lab Status: Final result Specimen: Blood from Arm, Right Updated: 07/15/24 1220     Sodium 136 mmol/L      Potassium 3.9 mmol/L      Chloride 99 mmol/L      CO2 29 mmol/L      ANION GAP 8 mmol/L      BUN 23 mg/dL      Creatinine 0.91 mg/dL      Glucose 114 mg/dL      Calcium 9.5 mg/dL      AST 24 U/L      ALT 26 U/L      Alkaline Phosphatase 74 U/L      Total Protein 7.5 g/dL      Albumin 3.9 g/dL      Total Bilirubin 0.63 mg/dL      eGFR 88 ml/min/1.73sq m     Narrative:      National Kid[employer] Foundation guidelines for Chronic Kidney Disease (CKD):     Stage 1 with normal or high GFR (GFR > 90 mL/min/1.73 square meters)    Stage 2 Mild CKD (GFR = 60-89 mL/min/1.73 square meters)    Stage 3A Moderate CKD (GFR = 45-59 mL/min/1.73 square meters)    Stage 3B Moderate CKD (GFR = 30-44 mL/min/1.73 square meters)    Stage 4 Severe CKD (GFR = 15-29 mL/min/1.73 square meters)    Stage 5 End Stage CKD (GFR <15 mL/min/1.73 square meters)  Note: GFR calculation is accurate only with a steady state creatinine    Protime-INR [[id number]]  (Abnormal) Collected: 07/15/24 9906    Lab Status: Final result Specimen: Blood from  Arm, Right Updated: 07/15/24 1202     Protime 23.4 seconds      INR 2.03    Narrative:      Result verified by Repeat    APTT [[id number]]  (Normal) Collected: 07/15/24 1131    Lab Status: Final result Specimen: Blood from Arm, Right Updated: 07/15/24 1202     PTT 36 seconds     Procalcitonin [[id number]]  (Normal) Collected: 07/15/24 1131    Lab Status: Final result Specimen: Blood from Arm, Right Updated: 07/15/24 1201     Procalcitonin 0.06 ng/ml     Lactic acid, plasma (w/reflex if result > 2.0) [[id number]]  (Normal) Collected: 07/15/24 1131    Lab Status: Final result Specimen: Blood from Arm, Right Updated: 07/15/24 1153     LACTIC ACID 1.0 mmol/L     Narrative:      Result may be elevated if tourniquet was used during collection.    CBC and differential [[id number]]  (Abnormal) Collected: 07/15/24 1131    Lab Status: Final result Specimen: Blood from Arm, Right Updated: 07/15/24 1137     WBC 11.60 Thousand/uL      RBC 5.10 Million/uL      Hemoglobin 14.0 g/dL      Hematocrit 43.6 %      MCV 86 fL      MCH 27.5 pg      MCHC 32.1 g/dL      RDW 14.3 %      MPV 10.9 fL      Platelets 239 Thousands/uL      nRBC 0 /100 WBCs      Segmented % 68 %      Immature Grans % 1 %      Lymphocytes % 18 %      Monocytes % 8 %      Eosinophils Relative 4 %      Basophils Relative 1 %      Absolute Neutrophils 7.89 Thousands/µL      Absolute Immature Grans 0.10 Thousand/uL      Absolute Lymphocytes 2.12 Thousands/µL      Absolute Monocytes 0.93 Thousand/µL      Eosinophils Absolute 0.50 Thousand/µL      Basophils Absolute 0.06 Thousands/µL     Blood culture #1 [[id number]] Collected: 07/15/24 1131    Lab Status: In process Specimen: Blood from Arm, Right Updated: 07/15/24 1134                   No orders to display              Procedures  Procedures         ED Course                              Initial Sepsis Screening       Row Name 07/15/24 2187                Is the patient's history suggestive of a new or worsening infection?  Yes (Proceed)  -TS        Suspected source of infection wound infection  -TS        Indicate SIRS criteria --        Are two or more of the above signs & symptoms of infection both present and new to the patient? No  -TS                  User Key  (r) = Recorded By, (t) = Taken By, (c) = Cosigned By      Initials Name Provider Type    TS GUSTABO Tolbert Nurse Practitioner                                  Medical Decision Making  DDx including but not limited to: wound infection, wound dehiscence, cellulitis, failed outpatient treatment of antibiotics    On reviewing the patient's chart as well as media tab with prior documentation of his left lower extremity cellulitis, his left lower leg is more erythematous and swollen.  DP and PT pulses 2+.  He does have an open blister draining serous fluid on the posterior aspect of the calf.  Given failed outpatient treatment on Keflex, will discuss with [facility] for need for IV antibiotics.    Problems Addressed:  Cellulitis of left leg: acute illness or injury    Amount and/or Complexity of Data Reviewed  Labs: ordered.    Risk  OTC drugs.  Prescription drug management.  Decision regarding hospitalization.                 Disposition  Final diagnoses:   Cellulitis of left leg     Time reflects when diagnosis was documented in both MDM as applicable and the Disposition within this note       Time User Action Codes Description Comment    7/15/2024  1:11 PM Shabana Marie Add [L03.116] Cellulitis of left leg     7/15/2024  4:47 PM Caitlin Recinos Add [L03.116] Left leg cellulitis           ED Disposition       ED Disposition   Admit    Condition   Stable    Date/Time   Mon Jul 15, 2024  1:11 PM    Comment   Case was discussed with Dr. Hill and the patient's admission status was agreed to be Admission Status: inpatient status to the service of Dr. Hill .               Follow-up Information    None         Current Discharge Medication List        CONTINUE these medications which have  NOT CHANGED    Details   cephalexin (KEFLEX) 500 mg capsule Take 1 capsule (500 mg total) by mouth every 6 (six) hours for 7 days  Qty: 28 capsule, Refills: 0    Associated Diagnoses: Cellulitis of left leg      hydrochlorothiazide (HYDRODIURIL) 25 mg tablet Take 25 mg by mouth daily      warfarin (COUMADIN) 5 mg tablet Take 5 mg by mouth 7.5mg 3xweek, 5mg 4xweek      docusate sodium (COLACE) 100 mg capsule Take 1 capsule (100 mg total) by mouth 2 (two) times a day  Qty: 60 capsule, Refills: 0    Associated Diagnoses: Primary osteoarthritis of right knee                 PDMP Review         Value Time User    PDMP Reviewed  Yes 1/15/2024  8:15 AM Lina Cee PA-C            ED Provider  Electronically Signed by             GUSTABO Tolbert  07/15/24 7171

## 2024-07-15 NOTE — PROGRESS NOTES
Tru Sy Jr. is a 63 y.o. male who is currently ordered Vancomycin IV with management by the Pharmacy Consult service.  Relevant clinical data and objective / subjective history reviewed.  Vancomycin Assessment:  Indication and Goal AUC/Trough: Soft tissue (goal -600, trough >10)  Clinical Status: stable  Micro:     Renal Function:  SCr: 0.91 mg/dL  CrCl: 89 mL/min  Renal replacement: Not on dialysis  Days of Therapy: 1  Current Dose: Initial Dosing: Vancomycin 1250mg IV x1 followed by 1g IV Q12H  Vancomycin Plan:  New Dosing: As per initial dosing above  Estimated AUC: 503 mcg*hr/mL  Estimated Trough: 15.5 mcg/mL  Next Level: Random Level AM labs 0600  7/17/24  Renal Function Monitoring: Daily BMP and UOP  Pharmacy will continue to follow closely for s/sx of nephrotoxicity, infusion reactions and appropriateness of therapy.  BMP and CBC will be ordered per protocol. We will continue to follow the patient’s culture results and clinical progress daily.    Awais Reddy, Pharmacist

## 2024-07-15 NOTE — DISCHARGE INSTR - OTHER ORDERS
Plan:   Cleanse left posterior tibial wounds with NSS apply adaptic non adherent then Melgisorb AG to wound and cover with Mepilex bordered foam dressing. Change daily and prn soil or dislodgment

## 2024-07-15 NOTE — H&P
[facility]  H&P  Name: Tru Sy Jr. 63 y.o. male I MRN: [id number]  Unit/Bed#: [facility]-01 I Date of Admission: 7/15/2024   Date of Service: 7/15/2024 I Hospital Day: 0      Assessment & Plan   * Left leg cellulitis  Assessment & Plan  Patient with recent trauma on the left leg after he dropped a chainsaw on the left leg.  He had some abrasions and lesions few days later he was noted to have swelling and redness and presented to the emergency department.  He was seen in the emergency department 7/9, received tetanus injection and was discharged on oral Keflex.  Reported he did not improve with oral Keflex, he completed 6 days of treatment.  He noticed persistent left lower extremity swelling and blister/small ulcers on the left calf and some pain.  Patient reported he is left extremity is usually more swollen than the right.  Has a little bit of erythema on the left leg, mild weeping from the wounds  Does not meet sepsis criteria  Was started on cefepime and vancomycin in the emergency department.    Plan:  Continue cefepime and vancomycin  Leg elevation.  No concern for DVT as the patient is on Coumadin and INR is therapeutic  Patient might have venous insufficiency on that side.  Recommend leg elevation.  Patient not agreeable with Lasix for now.  Tylenol for pain  If still with persistent swelling tomorrow will trial Lasix, As will help with healing if edema is down      Chronic anticoagulation  Assessment & Plan  On Coumadin 7.5 mg 3 times a week and 5 mg rest of the days   Patient cannot remember all the days with 7.5 mg however he remembers Sunday and Monday  Continue 7.5 mg today, 5 mg starting tomorrow  INR between 2 and 3     Pulmonary embolism (HCC)  Assessment & Plan  History of bilateral PE 2 times secondary to factor V Leiden  On Coumadin    Benign essential hypertension  Assessment & Plan  On HCTZ 25 mg daily continue  Monitor         VTE Pharmacologic Prophylaxis: VTE  Score: 5 High Risk (Score >/= 5) - Pharmacological DVT Prophylaxis Ordered: warfarin (Coumadin). Sequential Compression Devices Ordered.  Code Status: Level 3 - DNAR and DNI   Discussion with family: Patient declined call to .     Anticipated Length of Stay: Patient will be admitted on an inpatient basis with an anticipated length of stay of greater than 2 midnights secondary to left leg cellulitis.    Total Time Spent on Date of Encounter in care of patient: 75 mins. This time was spent on one or more of the following: performing physical exam; counseling and coordination of care; obtaining or reviewing history; documenting in the medical record; reviewing/ordering tests, medications or procedures; communicating with other healthcare professionals and discussing with patient's family/caregivers.    Chief Complaint: Left leg worsening redness, swelling    History of Present Illness:  Tru Sy Jr. is a 63 y.o. male with a PMH of factor V Leiden and hypertension, bilateral PEs who presents with left leg swelling, erythema and pain.  Patient reported he dropped the chainsaw on his left leg days ago, he had some wounds and redness, was seen in the emergency department on 7/9 and started on Keflex.  Patient almost completed 7 days of treatment except 1 day however he presented to emergency department today stating that his leg is still swollen, has some blisters and weeping, erythema and intermittent pain..    Patient does not think he has any foreign body in that side and does not wish to proceed with any imaging for now.  If is not improving, will consider.  Continue antibiotics.  Review of Systems:  Review of Systems   Constitutional:  Negative for chills and fever.   Respiratory:  Negative for shortness of breath.    Cardiovascular:  Positive for leg swelling.   Gastrointestinal:  Negative for diarrhea, nausea and vomiting.   Skin:  Positive for color change and wound.   All other systems  reviewed and are negative.      Past Medical and Surgical History:   Past Medical History:   Diagnosis Date    Factor V Leiden (HCC)     H/O blood clots     pulmonary embolism    PONV (postoperative nausea and vomiting)        Past Surgical History:   Procedure Laterality Date    FOOT SURGERY Right     KNEE SURGERY Right 2009    [place] ARTHRP KNE CONDYLE&PLATU MEDIAL&LAT COMPARTMENTS Right 1/15/2024    Procedure: ARTHROPLASTY KNEE TOTAL, RIGHT SAMEDAY DISCHARGE;  Surgeon: Rick Wood MD;  Location: [facility];  Service: Orthopedics    SHOULDER SURGERY Bilateral        Meds/Allergies:  Prior to Admission medications    Medication Sig Start Date End Date Taking? Authorizing Provider   cephalexin (KEFLEX) 500 mg capsule Take 1 capsule (500 mg total) by mouth every 6 (six) hours for 7 days 7/9/24 7/16/24 Yes Jean Paul Reyes DO   hydrochlorothiazide (HYDRODIURIL) 25 mg tablet Take 25 mg by mouth daily 8/24/23  Yes Historical Provider, MD   warfarin (COUMADIN) 5 mg tablet Take 5 mg by mouth 7.5mg 3xweek, 5mg 4xweek   Yes Historical Provider, MD   docusate sodium (COLACE) 100 mg capsule Take 1 capsule (100 mg total) by mouth 2 (two) times a day 1/15/24 2/14/24  Rick Wood MD   celecoxib (CeleBREX) 200 mg capsule Take 1 capsule (200 mg total) by mouth 2 (two) times a day 1/15/24 7/15/24  Rick Wood MD   oxyCODONE (Roxicodone) 5 immediate release tablet Take 1 tablet (5 mg total) by mouth every 4 (four) hours as needed for severe pain for up to 15 doses Max Daily Amount: 30 mg  Patient not taking: Reported on 2/23/2024 1/22/24 7/15/24  Rick Wood MD     I have reviewed home medications with patient personally.    Allergies:   Allergies   Allergen Reactions    Statins Myalgia    Other Myalgia       Social History:  Marital Status: Single   Occupation: Contractor  Patient Pre-hospital Living Situation: Home  Patient Pre-hospital Level of Mobility: walks  Patient Pre-hospital Diet Restrictions:  "none  Substance Use History:   Social History     Substance and Sexual Activity   Alcohol Use Not Currently     Social History     Tobacco Use   Smoking Status Former    Current packs/day: 0.00    Types: Cigarettes    Quit date:     Years since quittin.5    Passive exposure: Never   Smokeless Tobacco Never     Social History     Substance and Sexual Activity   Drug Use Never       Family History:  Family History   Problem Relation Age of Onset    No Known Problems Mother     No Known Problems Father        Physical Exam:     Vitals:   Blood Pressure: 169/83 (07/15/24 1357)  Pulse: 62 (07/15/24 1357)  Temperature: (!) 97.2 °F (36.2 °C) (07/15/24 1357)  Temp Source: Temporal (07/15/24 1120)  Respirations: 20 (07/15/24 1329)  Height: 5' 3\" (160 cm) (07/15/24 1120)  Weight - Scale: 107 kg (236 lb) (07/15/24 1120)  SpO2: 98 % (07/15/24 1357)    Physical Exam  Vitals and nursing note reviewed.   Constitutional:       General: He is not in acute distress.     Appearance: He is obese. He is not diaphoretic.   HENT:      Head: Normocephalic.   Eyes:      General: No scleral icterus.        Right eye: No discharge.         Left eye: No discharge.   Cardiovascular:      Rate and Rhythm: Normal rate and regular rhythm.   Pulmonary:      Effort: Pulmonary effort is normal. No respiratory distress.      Breath sounds: Normal breath sounds. No wheezing, rhonchi or rales.   Abdominal:      General: There is no distension.      Palpations: Abdomen is soft.      Tenderness: There is no abdominal tenderness. There is no guarding or rebound.   Musculoskeletal:      Cervical back: Normal range of motion.      Right lower leg: Edema present.      Left lower leg: Edema (L> R) present.   Skin:     General: Skin is warm.   Neurological:      Mental Status: He is alert and oriented to person, place, and time.   Psychiatric:         Mood and Affect: Mood normal.         Behavior: Behavior normal.         Thought Content: Thought " content normal.         Judgment: Judgment normal.          Additional Data:     Lab Results:  Results from last 7 days   Lab Units 07/15/24  1131   WBC Thousand/uL 11.60*   HEMOGLOBIN g/dL 14.0   HEMATOCRIT % 43.6   PLATELETS Thousands/uL 239   SEGS PCT % 68   LYMPHO PCT % 18   MONO PCT % 8   EOS PCT % 4     Results from last 7 days   Lab Units 07/15/24  1131   SODIUM mmol/L 136   POTASSIUM mmol/L 3.9   CHLORIDE mmol/L 99   CO2 mmol/L 29   BUN mg/dL 23   CREATININE mg/dL 0.91   ANION GAP mmol/L 8   CALCIUM mg/dL 9.5   ALBUMIN g/dL 3.9   TOTAL BILIRUBIN mg/dL 0.63   ALK PHOS U/L 74   ALT U/L 26   AST U/L 24   GLUCOSE RANDOM mg/dL 114     Results from last 7 days   Lab Units 07/15/24  1131   INR  2.03*         Lab Results   Component Value Date    HGBA1C 6.2 (H) 12/16/2023     Results from last 7 days   Lab Units 07/15/24  1131   LACTIC ACID mmol/L 1.0   PROCALCITONIN ng/ml 0.06       Lines/Drains:  Invasive Devices       Peripheral Intravenous Line  Duration             Peripheral IV 07/15/24 Right Antecubital <1 day                        Imaging: No pertinent imaging reviewed.  No orders to display       EKG and Other Studies Reviewed on Admission:   EKG: No EKG obtained.    ** Please Note: This note has been constructed using a voice recognition system. **

## 2024-07-15 NOTE — PLAN OF CARE
Problem: PAIN - ADULT  Goal: Verbalizes/displays adequate comfort level or baseline comfort level  Description: Interventions:  - Encourage patient to monitor pain and request assistance  - Assess pain using appropriate pain scale  - Administer analgesics based on type and severity of pain and evaluate response  - Implement non-pharmacological measures as appropriate and evaluate response  - Consider cultural and social influences on pain and pain management  - Notify physician/advanced practitioner if interventions unsuccessful or patient reports new pain  Outcome: Not Progressing     Problem: INFECTION - ADULT  Goal: Absence or prevention of progression during hospitalization  Description: INTERVENTIONS:  - Assess and monitor for signs and symptoms of infection  - Monitor lab/diagnostic results  - Monitor all insertion sites, i.e. indwelling lines, tubes, and drains  - Monitor endotracheal if appropriate and nasal secretions for changes in amount and color  - [place] appropriate cooling/warming therapies per order  - Administer medications as ordered  - Instruct and encourage patient and family to use good hand hygiene technique  - Identify and instruct in appropriate isolation precautions for identified infection/condition  Outcome: Not Progressing  Goal: Absence of fever/infection during neutropenic period  Description: INTERVENTIONS:  - Monitor WBC    Outcome: Not Progressing

## 2024-07-16 LAB
ANION GAP SERPL CALCULATED.3IONS-SCNC: 5 MMOL/L (ref 4–13)
BUN SERPL-MCNC: 19 MG/DL (ref 5–25)
CALCIUM SERPL-MCNC: 8.9 MG/DL (ref 8.4–10.2)
CHLORIDE SERPL-SCNC: 102 MMOL/L (ref 96–108)
CO2 SERPL-SCNC: 28 MMOL/L (ref 21–32)
CREAT SERPL-MCNC: 0.88 MG/DL (ref 0.6–1.3)
ERYTHROCYTE [DISTWIDTH] IN BLOOD BY AUTOMATED COUNT: 14.4 % (ref 11.6–15.1)
GFR SERPL CREATININE-BSD FRML MDRD: 90 ML/MIN/1.73SQ M
GLUCOSE SERPL-MCNC: 134 MG/DL (ref 65–140)
HCT VFR BLD AUTO: 42.5 % (ref 36.5–49.3)
HGB BLD-MCNC: 13.8 G/DL (ref 12–17)
INR PPP: 1.82 (ref 0.84–1.19)
MCH RBC QN AUTO: 28 PG (ref 26.8–34.3)
MCHC RBC AUTO-ENTMCNC: 32.5 G/DL (ref 31.4–37.4)
MCV RBC AUTO: 86 FL (ref 82–98)
PLATELET # BLD AUTO: 222 THOUSANDS/UL (ref 149–390)
PMV BLD AUTO: 11.4 FL (ref 8.9–12.7)
POTASSIUM SERPL-SCNC: 4.3 MMOL/L (ref 3.5–5.3)
PROTHROMBIN TIME: 21.5 SECONDS (ref 11.6–14.5)
RBC # BLD AUTO: 4.92 MILLION/UL (ref 3.88–5.62)
SODIUM SERPL-SCNC: 135 MMOL/L (ref 135–147)
WBC # BLD AUTO: 10.01 THOUSAND/UL (ref 4.31–10.16)

## 2024-07-16 PROCEDURE — 80048 BASIC METABOLIC PNL TOTAL CA: CPT | Performed by: INTERNAL MEDICINE

## 2024-07-16 PROCEDURE — 85610 PROTHROMBIN TIME: CPT | Performed by: INTERNAL MEDICINE

## 2024-07-16 PROCEDURE — 99232 SBSQ HOSP IP/OBS MODERATE 35: CPT | Performed by: INTERNAL MEDICINE

## 2024-07-16 PROCEDURE — 85027 COMPLETE CBC AUTOMATED: CPT | Performed by: INTERNAL MEDICINE

## 2024-07-16 RX ORDER — FUROSEMIDE 10 MG/ML
40 INJECTION INTRAMUSCULAR; INTRAVENOUS ONCE
Status: COMPLETED | OUTPATIENT
Start: 2024-07-16 | End: 2024-07-16

## 2024-07-16 RX ADMIN — HYDROCHLOROTHIAZIDE 25 MG: 25 TABLET ORAL at 09:17

## 2024-07-16 RX ADMIN — VANCOMYCIN HYDROCHLORIDE 1000 MG: 1 INJECTION, SOLUTION INTRAVENOUS at 09:58

## 2024-07-16 RX ADMIN — WARFARIN SODIUM 5 MG: 5 TABLET ORAL at 17:38

## 2024-07-16 RX ADMIN — CEFEPIME HYDROCHLORIDE 2000 MG: 2 INJECTION, SOLUTION INTRAVENOUS at 09:17

## 2024-07-16 RX ADMIN — VANCOMYCIN HYDROCHLORIDE 1000 MG: 1 INJECTION, SOLUTION INTRAVENOUS at 19:32

## 2024-07-16 RX ADMIN — FUROSEMIDE 40 MG: 10 INJECTION, SOLUTION INTRAMUSCULAR; INTRAVENOUS at 11:31

## 2024-07-16 NOTE — ASSESSMENT & PLAN NOTE
Patient with recent trauma on the left leg after he dropped a chainsaw on the left leg.  He had some abrasions and lesions few days later he was noted to have swelling and redness and presented to the emergency department.  He was seen in the emergency department 7/9, received tetanus injection and was discharged on oral Keflex.  Reported he did not improve with oral Keflex, he completed 6 days of treatment.  He noticed persistent left lower extremity swelling and blister/small ulcers on the left calf and some pain.  Patient reported he is left extremity is usually more swollen than the right.  Has a little bit of erythema on the left leg, mild weeping from the wounds  Does not meet sepsis criteria  Was started on cefepime and vancomycin in the emergency department.  Improved today.     Plan:  Continue cefepime and vancomycin  Leg elevation.  No concern for DVT as the patient is on Coumadin   Patient might have venous insufficiency on that side.  Recommend leg elevation.   Tylenol for pain  Trial of Lasix 40 mg iv once for LE edema

## 2024-07-16 NOTE — PLAN OF CARE
Problem: PAIN - ADULT  Goal: Verbalizes/displays adequate comfort level or baseline comfort level  Description: Interventions:  - Encourage patient to monitor pain and request assistance  - Assess pain using appropriate pain scale  - Administer analgesics based on type and severity of pain and evaluate response  - Implement non-pharmacological measures as appropriate and evaluate response  - Consider cultural and social influences on pain and pain management  - Notify physician/advanced practitioner if interventions unsuccessful or patient reports new pain  Outcome: Progressing     Problem: INFECTION - ADULT  Goal: Absence or prevention of progression during hospitalization  Description: INTERVENTIONS:  - Assess and monitor for signs and symptoms of infection  - Monitor lab/diagnostic results  - Monitor all insertion sites, i.e. indwelling lines, tubes, and drains  - Monitor endotracheal if appropriate and nasal secretions for changes in amount and color  - [place] appropriate cooling/warming therapies per order  - Administer medications as ordered  - Instruct and encourage patient and family to use good hand hygiene technique  - Identify and instruct in appropriate isolation precautions for identified infection/condition  Outcome: Progressing  Goal: Absence of fever/infection during neutropenic period  Description: INTERVENTIONS:  - Monitor WBC    Outcome: Progressing

## 2024-07-16 NOTE — MALNUTRITION/BMI
This medical record reflects one or more clinical indicators suggestive of malnutrition and/or morbid obesity.      BMI Findings:  Adult BMI Classifications: Morbid Obesity 40-44.9        Body mass index is 41.79 kg/m².   Treat with diet.     See Nutrition note dated 7/16/24  for additional details.  Completed nutrition assessment is viewable in the nutrition documentation.

## 2024-07-16 NOTE — UTILIZATION REVIEW
Initial Clinical Review    Admission: Date/Time/Statement:   Admission Orders (From admission, onward)       Ordered        07/15/24 1311  INPATIENT ADMISSION  Once                          Orders Placed This Encounter   Procedures    INPATIENT ADMISSION     Standing Status:   Standing     Number of Occurrences:   1     Order Specific Question:   Level of Care     Answer:   Med Surg [16]     Order Specific Question:   Estimated length of stay     Answer:   More than 2 Midnights     Order Specific Question:   Certification     Answer:   I certify that inpatient services are medically necessary for this patient for a duration of greater than two midnights. See H&P and MD Progress Notes for additional information about the patient's course of treatment.     ED Arrival Information       Expected   -    Arrival   7/15/2024 11:02    Acuity   Urgent              Means of arrival   Walk-In    Escorted by   Self    Service   Hospitalist    Admission type   Emergency              Arrival complaint   calf pain             Chief Complaint   Patient presents with    Cellulitis     Pt with recent chainsaw injury to left lower extremity. Pt with swelling and redness and painful left calf today       Initial Presentation: 63 y.o. male presents to the ED from home with c/o L leg swelling, erythema, weeping at site, and pain post recent chainsaw injury to L leg.  Was seen in ED on 7/9 treated and d/c on Keflex with all but 1 day completed with worsening symptoms.  PMH:  factor V Leiden, bilateral Pes, HTN.  Labs - leukocytosis, elevated INR.  Imaging - declined.  Treated with IV antibiotics.  On exam BLE edema L>R.  Admitted to INPATIENT status with L leg cellulitis, chronic Coumadin use - PLAN  - IV antibiotics, elevation, leg evaluation, PRN analgesia, some swelling but pt declining Lasix, will consider if still issue on 7/15, daily Coumadin, home HCTZ.  Seen by Wound care nurse and orders written.      Anticipated Length of  Stay/Certification Statement: Patient will be admitted on an inpatient basis with an anticipated length of stay of greater than 2 midnights secondary to left leg cellulitis.     Date: 7/16   Day 2:   Traumatic injury L leg - WBC WNL. On exam wound on LLE is weeping, + erythema, BLE edema L >R, improving today.  Trial of IV Lasix 40 mg today.  Pt feels improvement in LLE.  Continues on IV antibiotics, elevating BLE.  INR subtherapeutic today,  Coumadin 5 mg today.     ED Triage Vitals [07/15/24 1120]   Temperature Pulse Respirations Blood Pressure SpO2 Pain Score   98.1 °F (36.7 °C) 65 20 146/71 98 % 5     Weight (last 2 days)       Date/Time Weight    07/15/24 13:57:30 107 (235.89)    07/15/24 1120 107 (236)            Vital Signs (last 3 days)       Date/Time Temp Pulse Resp BP MAP (mmHg) SpO2 O2 Device Patient Position - Orthostatic VS Pain    07/16/24 0900 -- -- -- -- -- -- -- -- No Pain    07/16/24 07:15:23 96.4 °F (35.8 °C) 68 -- 153/96 115 99 % -- -- --    07/15/24 19:45:25 97.7 °F (36.5 °C) 61 -- 161/75 104 96 % -- -- 2    07/15/24 1400 -- -- -- -- -- -- -- -- No Pain    07/15/24 13:57:30 97.2 °F (36.2 °C) 62 -- 169/83 112 98 % None (Room air) -- --    07/15/24 13:55:59 -- 63 -- 169/83 112 98 % -- -- --    07/15/24 1329 -- 68 20 137/72 -- 99 % None (Room air) -- --    07/15/24 1324 -- -- -- -- -- -- -- -- No Pain    07/15/24 1218 -- -- -- -- -- -- -- -- 5    07/15/24 1120 98.1 °F (36.7 °C) 65 20 146/71 -- 98 % None (Room air) Sitting 5              Pertinent Labs/Diagnostic Test Results:   Radiology:  No orders to display     Cardiology:    7/15 ECG - Normal sinus rhythm  Right bundle branch block  Abnormal ECG  GI:  No orders to display           Results from last 7 days   Lab Units 07/16/24  0442 07/15/24  1131   WBC Thousand/uL 10.01 11.60*   HEMOGLOBIN g/dL 13.8 14.0   HEMATOCRIT % 42.5 43.6   PLATELETS Thousands/uL 222 239   TOTAL NEUT ABS Thousands/µL  --  7.89*         Results from last 7 days   Lab  Units 07/16/24  0442 07/15/24  1131   SODIUM mmol/L 135 136   POTASSIUM mmol/L 4.3 3.9   CHLORIDE mmol/L 102 99   CO2 mmol/L 28 29   ANION GAP mmol/L 5 8   BUN mg/dL 19 23   CREATININE mg/dL 0.88 0.91   EGFR ml/min/1.73sq m 90 88   CALCIUM mg/dL 8.9 9.5     Results from last 7 days   Lab Units 07/15/24  1131   AST U/L 24   ALT U/L 26   ALK PHOS U/L 74   TOTAL PROTEIN g/dL 7.5   ALBUMIN g/dL 3.9   TOTAL BILIRUBIN mg/dL 0.63         Results from last 7 days   Lab Units 07/16/24  0442 07/15/24  1131   GLUCOSE RANDOM mg/dL 134 114     Results from last 7 days   Lab Units 07/16/24  0442 07/15/24  1131   PROTIME seconds 21.5* 23.4*   INR  1.82* 2.03*   PTT seconds  --  36         Results from last 7 days   Lab Units 07/15/24  1131   PROCALCITONIN ng/ml 0.06     Results from last 7 days   Lab Units 07/15/24  1131   LACTIC ACID mmol/L 1.0     Results from last 7 days   Lab Units 07/15/24  1333 07/15/24  1131   BLOOD CULTURE  Received in Microbiology Lab. Culture in Progress. Received in Microbiology Lab. Culture in Progress.         ED Treatment-Medication Administration from 07/15/2024 1101 to 07/15/2024 1352         Date/Time Order Dose Route Action     07/15/2024 1329 cefepime (MAXIPIME) IVPB (premix in dextrose) 2,000 mg 50 mL 2,000 mg Intravenous New Bag     07/15/2024 1341 vancomycin (VANCOCIN) 1250 mg in sodium chloride 0.9% 250 mL IVPB 1,250 mg Intravenous New Bag            Past Medical History:   Diagnosis Date    Factor V Leiden (HCC)     H/O blood clots     pulmonary embolism    PONV (postoperative nausea and vomiting)      Present on Admission:   Benign essential hypertension   Pulmonary embolism (HCC)   Left leg cellulitis      Admitting Diagnosis: Cellulitis [L03.90]  Calf pain [M79.669]  Cellulitis of left leg [L03.116]  Age/Sex: 63 y.o. male  Admission Orders:  Scheduled Medications:  cefepime, 2,000 mg, Intravenous, Q12H  hydroCHLOROthiazide, 25 mg, Oral, Daily  vancomycin, 1,000 mg, Intravenous,  Q12H  warfarin, 5 mg, Oral, Daily (warfarin)      Continuous IV Infusions:     PRN Meds:  acetaminophen, 650 mg, Oral, Q6H PRN    IV antibiotics  Wound care L post calf  IP CONSULT TO PHARMACY    Network Utilization Review Department  ATTENTION: Please call with any questions or concerns to [phone] and carefully listen to the prompts so that you are directed to the right person. All voicemails are confidential.   For Discharge needs, contact Care Management DC Support Team at [phone] opt. [phone]  Send all requests for admission clinical reviews, approved or denied determinations and any other requests to dedicated fax number below belonging to the [place] where the patient is receiving treatment. List of dedicated fax numbers for the Facilities:  FACILITY NAME UR FAX NUMBER   ADMISSION DENIALS (Administrative/Medical Necessity) [phone]   DISCHARGE SUPPORT TEAM (NETWORK) [phone]   PARENT CHILD HEALTH (Maternity/NICU/Pediatrics) [phone]   General acute hospital [phone]   [facility] [phone]   [facility] [phone]   [facility] [phone]   [facility] [phone]   [facility] [phone]   [facility] [phone]   [facility] [phone]   [facility] [phone]   [facility] [phone]   [facility] [phone]   [facility] [phone]

## 2024-07-16 NOTE — PROGRESS NOTES
Tru Sy JrDarell is a 63 y.o. male who is currently ordered Vancomycin IV with management by the Pharmacy Consult service.  Relevant clinical data and objective / subjective history reviewed.  Vancomycin Assessment:  Indication and Goal AUC/Trough: Soft tissue (goal -600, trough >10), -600, trough >10  Clinical Status: stable  Micro:     Renal Function:  SCr: 0.88 mg/dL  CrCl: 91.6 mL/min  Renal replacement: Not on dialysis  Days of Therapy: 2  Current Dose: 1000mg IV Q12H  Vancomycin Plan:  New Dosing: No change  Estimated AUC: 496 mcg*hr/mL  Estimated Trough: 15.2 mcg/mL  Next Level: With AM labs at 0600 on 7/17/24  Renal Function Monitoring: Daily BMP and UOP  Pharmacy will continue to follow closely for s/sx of nephrotoxicity, infusion reactions and appropriateness of therapy.  BMP and CBC will be ordered per protocol. We will continue to follow the patient’s culture results and clinical progress daily.    Sergio Tim, Pharmacist

## 2024-07-16 NOTE — PLAN OF CARE
Problem: PAIN - ADULT  Goal: Verbalizes/displays adequate comfort level or baseline comfort level  Description: Interventions:  - Encourage patient to monitor pain and request assistance  - Assess pain using appropriate pain scale  - Administer analgesics based on type and severity of pain and evaluate response  - Implement non-pharmacological measures as appropriate and evaluate response  - Consider cultural and social influences on pain and pain management  - Notify physician/advanced practitioner if interventions unsuccessful or patient reports new pain  Outcome: Progressing     Problem: INFECTION - ADULT  Goal: Absence or prevention of progression during hospitalization  Description: INTERVENTIONS:  - Assess and monitor for signs and symptoms of infection  - Monitor lab/diagnostic results  - Monitor all insertion sites, i.e. indwelling lines, tubes, and drains  - Monitor endotracheal if appropriate and nasal secretions for changes in amount and color  - [place] appropriate cooling/warming therapies per order  - Administer medications as ordered  - Instruct and encourage patient and family to use good hand hygiene technique  - Identify and instruct in appropriate isolation precautions for identified infection/condition  Outcome: Progressing  Goal: Absence of fever/infection during neutropenic period  Description: INTERVENTIONS:  - Monitor WBC    Outcome: Progressing     Problem: SAFETY ADULT  Goal: Maintain or return to baseline ADL function  Description: INTERVENTIONS:  -  Assess patient's ability to carry out ADLs; assess patient's baseline for ADL function and identify physical deficits which impact ability to perform ADLs (bathing, care of mouth/teeth, toileting, grooming, dressing, etc.)  - Assess/evaluate cause of self-care deficits   - Assess range of motion  - Assess patient's mobility; develop plan if impaired  - Assess patient's need for assistive devices and provide as appropriate  - Encourage  maximum independence but intervene and supervise when necessary  - Involve family in performance of ADLs  - Assess for home care needs following discharge   - Consider OT consult to assist with ADL evaluation and planning for discharge  - Provide patient education as appropriate  Outcome: Progressing

## 2024-07-16 NOTE — PROGRESS NOTES
[facility]  Progress Note  Name: Tru Díaz I  MRN: [id number]  Unit/Bed#: [facility]-01 I Date of Admission: 7/15/2024   Date of Service: 7/16/2024 I Hospital Day: 1    Assessment & Plan   * Left leg cellulitis  Assessment & Plan  Patient with recent trauma on the left leg after he dropped a chainsaw on the left leg.  He had some abrasions and lesions few days later he was noted to have swelling and redness and presented to the emergency department.  He was seen in the emergency department 7/9, received tetanus injection and was discharged on oral Keflex.  Reported he did not improve with oral Keflex, he completed 6 days of treatment.  He noticed persistent left lower extremity swelling and blister/small ulcers on the left calf and some pain.  Patient reported he is left extremity is usually more swollen than the right.  Has a little bit of erythema on the left leg, mild weeping from the wounds  Does not meet sepsis criteria  Was started on cefepime and vancomycin in the emergency department.  Improved today.     Plan:  Continue cefepime and vancomycin  Leg elevation.  No concern for DVT as the patient is on Coumadin   Patient might have venous insufficiency on that side.  Recommend leg elevation.   Tylenol for pain  Trial of Lasix 40 mg iv once for LE edema       Chronic anticoagulation  Assessment & Plan  On Coumadin 7.5 mg 3 times a week and 5 mg rest of the days   Patient cannot remember all the days with 7.5 mg however he remembers Sunday and Monday  Continue 5 mg today  INR between 2 and 3     Pulmonary embolism (HCC)  Assessment & Plan  History of bilateral PE 2 times secondary to factor V Leiden  On Coumadin, continue     Benign essential hypertension  Assessment & Plan  On HCTZ 25 mg daily continue  Monitor           VTE Pharmacologic Prophylaxis: VTE Score: 5 High Risk (Score >/= 5) - Pharmacological DVT Prophylaxis Ordered: warfarin (Coumadin). Sequential Compression  Devices Ordered.    Mobility:   Basic Mobility Inpatient Raw Score: 24  JH-HLM Goal: 8: Walk 250 feet or more  JH-HLM Achieved: 8: Walk 250 feet ot more  JH-HLM Goal achieved. Continue to encourage appropriate mobility.    Patient Centered Rounds: I performed bedside rounds with nursing staff today.   Discussions with Specialists or Other Care Team Provider: eb    Education and Discussions with Family / Patient: Patient declined call to .     Total Time Spent on Date of Encounter in care of patient: 35 mins. This time was spent on one or more of the following: performing physical exam; counseling and coordination of care; obtaining or reviewing history; documenting in the medical record; reviewing/ordering tests, medications or procedures; communicating with other healthcare professionals and discussing with patient's family/caregivers.    Current Length of Stay: 1 day(s)  Current Patient Status: Inpatient   Certification Statement: The patient will continue to require additional inpatient hospital stay due to le edema, weeping wound   Discharge Plan: Anticipate discharge tomorrow to home.    Code Status: Level 3 - DNAR and DNI    Subjective:     Feeling well, is feeling his leg is better today     Objective:     Vitals:   Temp (24hrs), Av.1 °F (36.2 °C), Min:96.4 °F (35.8 °C), Max:97.7 °F (36.5 °C)    Temp:  [96.4 °F (35.8 °C)-97.7 °F (36.5 °C)] 96.4 °F (35.8 °C)  HR:  [61-68] 68  Resp:  [20] 20  BP: (137-169)/(72-96) 153/96  SpO2:  [96 %-99 %] 99 %  Body mass index is 41.79 kg/m².     Input and Output Summary (last 24 hours):     Intake/Output Summary (Last 24 hours) at 2024 1300  Last data filed at 2024 1201  Gross per 24 hour   Intake 1320 ml   Output 400 ml   Net 920 ml       Physical Exam:   Physical Exam  Vitals and nursing note reviewed.   Constitutional:       General: He is not in acute distress.     Appearance: He is not diaphoretic.   HENT:      Head: Normocephalic.   Eyes:       General: No scleral icterus.        Right eye: No discharge.         Left eye: No discharge.   Cardiovascular:      Rate and Rhythm: Normal rate and regular rhythm.      Heart sounds: No murmur heard.  Pulmonary:      Effort: Pulmonary effort is normal. No respiratory distress.      Breath sounds: Normal breath sounds. No wheezing, rhonchi or rales.   Abdominal:      General: There is no distension.      Palpations: Abdomen is soft.      Tenderness: There is no abdominal tenderness. There is no guarding or rebound.   Musculoskeletal:      Cervical back: Normal range of motion.      Right lower leg: Edema present.      Left lower leg: Edema (L> R) present.   Skin:     General: Skin is warm.   Neurological:      Mental Status: He is alert and oriented to person, place, and time.   Psychiatric:         Mood and Affect: Mood normal.         Behavior: Behavior normal.         Thought Content: Thought content normal.         Judgment: Judgment normal.          Additional Data:     Labs:  Results from last 7 days   Lab Units 07/16/24  0442 07/15/24  1131   WBC Thousand/uL 10.01 11.60*   HEMOGLOBIN g/dL 13.8 14.0   HEMATOCRIT % 42.5 43.6   PLATELETS Thousands/uL 222 239   SEGS PCT %  --  68   LYMPHO PCT %  --  18   MONO PCT %  --  8   EOS PCT %  --  4     Results from last 7 days   Lab Units 07/16/24  0442 07/15/24  1131   SODIUM mmol/L 135 136   POTASSIUM mmol/L 4.3 3.9   CHLORIDE mmol/L 102 99   CO2 mmol/L 28 29   BUN mg/dL 19 23   CREATININE mg/dL 0.88 0.91   ANION GAP mmol/L 5 8   CALCIUM mg/dL 8.9 9.5   ALBUMIN g/dL  --  3.9   TOTAL BILIRUBIN mg/dL  --  0.63   ALK PHOS U/L  --  74   ALT U/L  --  26   AST U/L  --  24   GLUCOSE RANDOM mg/dL 134 114     Results from last 7 days   Lab Units 07/16/24  0442   INR  1.82*             Results from last 7 days   Lab Units 07/15/24  1131   LACTIC ACID mmol/L 1.0   PROCALCITONIN ng/ml 0.06       Lines/Drains:  Invasive Devices       Peripheral Intravenous Line  Duration              Peripheral IV 07/15/24 Right Antecubital <1 day                          Imaging: No pertinent imaging reviewed.    Recent Cultures (last 7 days):   Results from last 7 days   Lab Units 07/15/24  1333 07/15/24  1131   BLOOD CULTURE  Received in Microbiology Lab. Culture in Progress. Received in Microbiology Lab. Culture in Progress.       Last 24 Hours Medication List:   Current Facility-Administered Medications   Medication Dose Route Frequency Provider Last Rate    acetaminophen  650 mg Oral Q6H PRN Radha Padilla MD      cefepime  2,000 mg Intravenous Q12H Radha Padilla MD 2,000 mg (07/16/24 0917)    hydroCHLOROthiazide  25 mg Oral Daily Radha Padilla MD      vancomycin  1,000 mg Intravenous Q12H Radha Padilla MD 1,000 mg (07/16/24 0958)    warfarin  5 mg Oral Daily (warfarin) Radha Padilla MD          Today, Patient Was Seen By: Radha Padilla MD    **Please Note: This note may have been constructed using a voice recognition system.**

## 2024-07-16 NOTE — ASSESSMENT & PLAN NOTE
On Coumadin 7.5 mg 3 times a week and 5 mg rest of the days   Patient cannot remember all the days with 7.5 mg however he remembers Sunday and Monday  Continue 5 mg today  INR between 2 and 3

## 2024-07-17 VITALS
SYSTOLIC BLOOD PRESSURE: 147 MMHG | WEIGHT: 235.89 LBS | TEMPERATURE: 97.3 F | RESPIRATION RATE: 20 BRPM | OXYGEN SATURATION: 96 % | BODY MASS INDEX: 41.8 KG/M2 | HEART RATE: 65 BPM | DIASTOLIC BLOOD PRESSURE: 82 MMHG | HEIGHT: 63 IN

## 2024-07-17 LAB
ANION GAP SERPL CALCULATED.3IONS-SCNC: 8 MMOL/L (ref 4–13)
BUN SERPL-MCNC: 22 MG/DL (ref 5–25)
CALCIUM SERPL-MCNC: 9.1 MG/DL (ref 8.4–10.2)
CHLORIDE SERPL-SCNC: 99 MMOL/L (ref 96–108)
CO2 SERPL-SCNC: 30 MMOL/L (ref 21–32)
CREAT SERPL-MCNC: 0.93 MG/DL (ref 0.6–1.3)
ERYTHROCYTE [DISTWIDTH] IN BLOOD BY AUTOMATED COUNT: 14.4 % (ref 11.6–15.1)
GFR SERPL CREATININE-BSD FRML MDRD: 86 ML/MIN/1.73SQ M
GLUCOSE SERPL-MCNC: 134 MG/DL (ref 65–140)
HCT VFR BLD AUTO: 43.3 % (ref 36.5–49.3)
HGB BLD-MCNC: 14.1 G/DL (ref 12–17)
INR PPP: 2.21 (ref 0.84–1.19)
MCH RBC QN AUTO: 27.7 PG (ref 26.8–34.3)
MCHC RBC AUTO-ENTMCNC: 32.6 G/DL (ref 31.4–37.4)
MCV RBC AUTO: 85 FL (ref 82–98)
PLATELET # BLD AUTO: 242 THOUSANDS/UL (ref 149–390)
PMV BLD AUTO: 11.4 FL (ref 8.9–12.7)
POTASSIUM SERPL-SCNC: 3.3 MMOL/L (ref 3.5–5.3)
PROTHROMBIN TIME: 25 SECONDS (ref 11.6–14.5)
RBC # BLD AUTO: 5.09 MILLION/UL (ref 3.88–5.62)
SODIUM SERPL-SCNC: 137 MMOL/L (ref 135–147)
VANCOMYCIN SERPL-MCNC: 14.3 UG/ML (ref 10–20)
WBC # BLD AUTO: 9.59 THOUSAND/UL (ref 4.31–10.16)

## 2024-07-17 PROCEDURE — 99239 HOSP IP/OBS DSCHRG MGMT >30: CPT | Performed by: INTERNAL MEDICINE

## 2024-07-17 PROCEDURE — 85610 PROTHROMBIN TIME: CPT | Performed by: INTERNAL MEDICINE

## 2024-07-17 PROCEDURE — 85027 COMPLETE CBC AUTOMATED: CPT | Performed by: INTERNAL MEDICINE

## 2024-07-17 PROCEDURE — 80202 ASSAY OF VANCOMYCIN: CPT | Performed by: INTERNAL MEDICINE

## 2024-07-17 PROCEDURE — 80048 BASIC METABOLIC PNL TOTAL CA: CPT | Performed by: INTERNAL MEDICINE

## 2024-07-17 RX ORDER — POTASSIUM CHLORIDE 20 MEQ/1
40 TABLET, EXTENDED RELEASE ORAL ONCE
Status: COMPLETED | OUTPATIENT
Start: 2024-07-17 | End: 2024-07-17

## 2024-07-17 RX ORDER — AMOXICILLIN AND CLAVULANATE POTASSIUM 875; 125 MG/1; MG/1
1 TABLET, FILM COATED ORAL EVERY 12 HOURS SCHEDULED
Qty: 10 TABLET | Refills: 0 | Status: SHIPPED | OUTPATIENT
Start: 2024-07-17 | End: 2024-07-22

## 2024-07-17 RX ORDER — DOXYCYCLINE 100 MG/1
100 TABLET ORAL 2 TIMES DAILY
Qty: 10 TABLET | Refills: 0 | Status: SHIPPED | OUTPATIENT
Start: 2024-07-17 | End: 2024-07-22

## 2024-07-17 RX ORDER — FUROSEMIDE 10 MG/ML
40 INJECTION INTRAMUSCULAR; INTRAVENOUS ONCE
Status: COMPLETED | OUTPATIENT
Start: 2024-07-17 | End: 2024-07-17

## 2024-07-17 RX ADMIN — POTASSIUM CHLORIDE 40 MEQ: 1500 TABLET, EXTENDED RELEASE ORAL at 08:39

## 2024-07-17 RX ADMIN — VANCOMYCIN HYDROCHLORIDE 1000 MG: 1 INJECTION, SOLUTION INTRAVENOUS at 08:30

## 2024-07-17 RX ADMIN — FUROSEMIDE 40 MG: 10 INJECTION, SOLUTION INTRAMUSCULAR; INTRAVENOUS at 12:30

## 2024-07-17 RX ADMIN — HYDROCHLOROTHIAZIDE 25 MG: 25 TABLET ORAL at 08:30

## 2024-07-17 NOTE — PLAN OF CARE
Problem: PAIN - ADULT  Goal: Verbalizes/displays adequate comfort level or baseline comfort level  Description: Interventions:  - Encourage patient to monitor pain and request assistance  - Assess pain using appropriate pain scale  - Administer analgesics based on type and severity of pain and evaluate response  - Implement non-pharmacological measures as appropriate and evaluate response  - Consider cultural and social influences on pain and pain management  - Notify physician/advanced practitioner if interventions unsuccessful or patient reports new pain  Outcome: Progressing     Problem: INFECTION - ADULT  Goal: Absence or prevention of progression during hospitalization  Description: INTERVENTIONS:  - Assess and monitor for signs and symptoms of infection  - Monitor lab/diagnostic results  - Monitor all insertion sites, i.e. indwelling lines, tubes, and drains  - Monitor endotracheal if appropriate and nasal secretions for changes in amount and color  - [place] appropriate cooling/warming therapies per order  - Administer medications as ordered  - Instruct and encourage patient and family to use good hand hygiene technique  - Identify and instruct in appropriate isolation precautions for identified infection/condition  Outcome: Progressing  Goal: Absence of fever/infection during neutropenic period  Description: INTERVENTIONS:  - Monitor WBC    Outcome: Progressing     Problem: SAFETY ADULT  Goal: Maintain or return to baseline ADL function  Description: INTERVENTIONS:  -  Assess patient's ability to carry out ADLs; assess patient's baseline for ADL function and identify physical deficits which impact ability to perform ADLs (bathing, care of mouth/teeth, toileting, grooming, dressing, etc.)  - Assess/evaluate cause of self-care deficits   - Assess range of motion  - Assess patient's mobility; develop plan if impaired  - Assess patient's need for assistive devices and provide as appropriate  - Encourage  maximum independence but intervene and supervise when necessary  - Involve family in performance of ADLs  - Assess for home care needs following discharge   - Consider OT consult to assist with ADL evaluation and planning for discharge  - Provide patient education as appropriate  Outcome: Progressing

## 2024-07-17 NOTE — DISCHARGE SUMMARY
[facility]  Discharge- Tur Sy Jr. 1960, 63 y.o. male MRN: [id number]  Unit/Bed#: [facility] Encounter: [id number]  Primary Care Provider: Agustin Ledbetter DO   Date and time admitted to hospital: 7/15/2024 12:12 PM    * Left leg cellulitis  Assessment & Plan  Patient with recent trauma on the left leg after he dropped a chainsaw on the left leg.  He had some abrasions and lesions few days later he was noted to have swelling and redness and presented to the emergency department.  He was seen in the emergency department 7/9, received tetanus injection and was discharged on oral Keflex.  Reported he did not improve with oral Keflex, he completed 6 days of treatment.  He noticed persistent left lower extremity swelling and blister/small ulcers on the left calf and some pain.  Patient reported he is left extremity is usually more swollen than the right.  Has a little bit of erythema on the left leg, mild weeping from the wounds  Does not meet sepsis criteria  Was started on cefepime and vancomycin in the emergency department.  No concern for DVT as the patient is on Coumadin     Patient requested to be discharged today   Discharged on Augmentin and Doxycycline for 5 days   Patient might have venous insufficiency on that side.  Recommend leg elevation.   Tylenol for pain  Continue wound care as recommended by wound specialist   Come back to er if worsening pain, redness, wound, chills or fever - patient verbalized understanding   F/u with wound care outpatient as needed         Chronic anticoagulation  Assessment & Plan  On Coumadin 7.5 mg 3 times a week and 5 mg rest of the days   Continue   INR between 2 and 3     Pulmonary embolism (HCC)  Assessment & Plan  History of bilateral PE 2 times secondary to factor V Leiden  On Coumadin, continue     Benign essential hypertension  Assessment & Plan  On HCTZ 25 mg daily continue        Medical Problems       Resolved Problems  Date  Reviewed: 3/28/2024   None       Discharging Physician / Practitioner: Radha Padilla MD  PCP: Agustin Ledbetter DO  Admission Date:   Admission Orders (From admission, onward)       Ordered        07/15/24 1311  INPATIENT ADMISSION  Once                          Discharge Date: 07/17/24    Consultations During Hospital Stay:  Wound care    Procedures Performed:   None    Significant Findings / Test Results:   none    Incidental Findings:   none    Test Results Pending at Discharge (will require follow up):   none     Outpatient Tests Requested:  none    Complications:  none    Reason for Admission: leg swelling and redness    Hospital Course:   Tru Sy Jr. is a 63 y.o. male patient with PMH of factor V Leiden and hypertension, bilateral PEs who originally presented to the hospital on 7/15/2024 due to  who presents with left leg swelling, erythema and pain. Patient reported he dropped the chainsaw on his left leg days ago, he had some wounds and redness, was seen in the emergency department on 7/9 and started on Keflex.  Patient almost completed 7 days of treatment except 1 day however he presented to emergency department today stating that his leg is still swollen, has some blisters and weeping, erythema and intermittent pain.  Was started on IV antibiotics and reported improvement.  He also was noted to have lower extremity edema mild on the right and worse on the left side.  He might have had DVTs on the right extremity.  Received Lasix IV x 2 to help with the swelling, improved.    On admission, patient refused any imaging to that leg, stating that he only has superficial wound     Patient discharged on antibiotics for 5 days.  Follow-up with family doctor in 1 week.      Please see above list of diagnoses and related plan for additional information.     Condition at Discharge: good    Discharge Day Visit / Exam:   Subjective:  no symptoms   Vitals: Blood Pressure: 147/82 (07/17/24 0706)  Pulse: 65  "(07/17/24 0706)  Temperature: (!) 97.3 °F (36.3 °C) (07/17/24 0706)  Temp Source: Temporal (07/17/24 0706)  Respirations: 20 (07/15/24 1329)  Height: 5' 3\" (160 cm) (07/15/24 1357)  Weight - Scale: 107 kg (235 lb 14.3 oz) (07/15/24 1357)  SpO2: 96 % (07/17/24 0706)  Exam:   Physical Exam  Vitals and nursing note reviewed.   Constitutional:       General: He is not in acute distress.     Appearance: He is not diaphoretic.   HENT:      Head: Normocephalic.   Eyes:      General: No scleral icterus.        Right eye: No discharge.         Left eye: No discharge.   Cardiovascular:      Rate and Rhythm: Normal rate and regular rhythm.      Heart sounds: No murmur heard.  Pulmonary:      Effort: Pulmonary effort is normal. No respiratory distress.      Breath sounds: Normal breath sounds. No wheezing, rhonchi or rales.   Abdominal:      General: There is no distension.      Palpations: Abdomen is soft.      Tenderness: There is no abdominal tenderness. There is no guarding or rebound.   Musculoskeletal:      Cervical back: Normal range of motion.      Right lower leg: No edema.      Left lower leg: Edema present.   Skin:     General: Skin is warm.   Neurological:      Mental Status: He is alert and oriented to person, place, and time.   Psychiatric:         Mood and Affect: Mood normal.         Behavior: Behavior normal.         Thought Content: Thought content normal.         Judgment: Judgment normal.          Discussion with Family: Patient declined call to .     Discharge instructions/Information to patient and family:   See after visit summary for information provided to patient and family.      Provisions for Follow-Up Care:  See after visit summary for information related to follow-up care and any pertinent home health orders.      Mobility at time of Discharge:   Basic Mobility Inpatient Raw Score: 24  JH-HLM Goal: 8: Walk 250 feet or more  JH-HLM Achieved: 8: Walk 250 feet ot more  HLM Goal achieved. " Continue to encourage appropriate mobility.     Disposition:   Home    Planned Readmission: no     Discharge Statement:  I spent 38 minutes discharging the patient. This time was spent on the day of discharge. I had direct contact with the patient on the day of discharge. Greater than 50% of the total time was spent examining patient, answering all patient questions, arranging and discussing plan of care with patient as well as directly providing post-discharge instructions.  Additional time then spent on discharge activities.    Discharge Medications:  See after visit summary for reconciled discharge medications provided to patient and/or family.      **Please Note: This note may have been constructed using a voice recognition system**

## 2024-07-17 NOTE — ASSESSMENT & PLAN NOTE
Patient with recent trauma on the left leg after he dropped a chainsaw on the left leg.  He had some abrasions and lesions few days later he was noted to have swelling and redness and presented to the emergency department.  He was seen in the emergency department 7/9, received tetanus injection and was discharged on oral Keflex.  Reported he did not improve with oral Keflex, he completed 6 days of treatment.  He noticed persistent left lower extremity swelling and blister/small ulcers on the left calf and some pain.  Patient reported he is left extremity is usually more swollen than the right.  Has a little bit of erythema on the left leg, mild weeping from the wounds  Does not meet sepsis criteria  Was started on cefepime and vancomycin in the emergency department.  No concern for DVT as the patient is on Coumadin     Patient requested to be discharged today   Discharged on Augmentin and Doxycycline for 5 days   Patient might have venous insufficiency on that side.  Recommend leg elevation.   Tylenol for pain  Continue wound care as recommended by wound specialist   Come back to er if worsening pain, redness, wound, chills or fever - patient verbalized understanding   F/u with wound care outpatient as needed

## 2024-07-17 NOTE — PROGRESS NOTES
Tru Sy JrDarell is a 63 y.o. male who is currently ordered Vancomycin IV with management by the Pharmacy Consult service.  Relevant clinical data and objective / subjective history reviewed.  Vancomycin Assessment:  Indication and Goal AUC/Trough: Soft tissue (goal -600, trough >10), -600, trough >10  Clinical Status: stable  Micro:     Renal Function:  SCr: 0.93 mg/dL  CrCl: 87.3 mL/min  Renal replacement: Not on dialysis  Days of Therapy: 3  Current Dose: 1000mg IV Q12H  Vancomycin Plan:  New Dosing: no change  Estimated AUC: 488 mcg*hr/mL  Estimated Trough: 15.1 mcg/mL  Next Level: 0600 on 7/24  Renal Function Monitoring: Daily BMP and UOP  Pharmacy will continue to follow closely for s/sx of nephrotoxicity, infusion reactions and appropriateness of therapy.  BMP and CBC will be ordered per protocol. We will continue to follow the patient’s culture results and clinical progress daily.    Cirilo Black, Pharmacist

## 2024-07-17 NOTE — PLAN OF CARE
Problem: PAIN - ADULT  Goal: Verbalizes/displays adequate comfort level or baseline comfort level  Description: Interventions:  - Encourage patient to monitor pain and request assistance  - Assess pain using appropriate pain scale  - Administer analgesics based on type and severity of pain and evaluate response  - Implement non-pharmacological measures as appropriate and evaluate response  - Consider cultural and social influences on pain and pain management  - Notify physician/advanced practitioner if interventions unsuccessful or patient reports new pain  Outcome: Progressing     Problem: INFECTION - ADULT  Goal: Absence or prevention of progression during hospitalization  Description: INTERVENTIONS:  - Assess and monitor for signs and symptoms of infection  - Monitor lab/diagnostic results  - Monitor all insertion sites, i.e. indwelling lines, tubes, and drains  - Monitor endotracheal if appropriate and nasal secretions for changes in amount and color  - [place] appropriate cooling/warming therapies per order  - Administer medications as ordered  - Instruct and encourage patient and family to use good hand hygiene technique  - Identify and instruct in appropriate isolation precautions for identified infection/condition  Outcome: Progressing     Problem: DISCHARGE PLANNING  Goal: Discharge to home or other facility with appropriate resources  Description: INTERVENTIONS:  - Identify barriers to discharge w/patient and caregiver  - Arrange for needed discharge resources and transportation as appropriate  - Identify discharge learning needs (meds, wound care, etc.)  - Arrange for interpretive services to assist at discharge as needed  - Refer to Case Management Department for coordinating discharge planning if the patient needs post-hospital services based on physician/advanced practitioner order or complex needs related to functional status, cognitive ability, or social support system  Outcome: Progressing

## 2024-07-17 NOTE — CASE MANAGEMENT
Case Management Assessment    Patient name Tru Sy Jr.  Location /-01 MRN [id number]  : 1960 Date 2024       Current Admission Date: 7/15/2024  Current Admission Diagnosis:Left leg cellulitis   Patient Active Problem List    Diagnosis Date Noted Date Diagnosed    Left leg cellulitis 07/15/2024     S/P total knee replacement, right 2024     H/O blood clots 01/15/2024     PONV (postoperative nausea and vomiting) 01/15/2024     Primary osteoarthritis of left knee 2023     Prediabetes 2021     Chronic anticoagulation 2016     Factor V Leiden (HCC) 2015     Statin intolerance 2015     Pulmonary embolism (HCC) 2015     Hypercholesterolemia 2015     Benign essential hypertension 2014     Obesity 2014       LOS (days): 2  Geometric Mean LOS (GMLOS) (days):   Days to GMLOS:     OBJECTIVE:    Risk of Unplanned Readmission Score: 9.05         Current admission status: Inpatient       Preferred Pharmacy:   [facility] [address] [place] [address] [address] [address]  Phone: [phone] Fax: [phone]    [facility] [place] [place] - [address]  [facility],  [address] [place] [address],  [facility] [address]  Phone: [phone] Fax: [phone]    Primary Care Provider: Agustin Ledbetter DO    Primary Insurance: BLUE CROSS  Secondary Insurance:     ASSESSMENT:  Active Health Care Proxies    There are no active Health Care Proxies on file.       Social Determinants of Health (SDOH)      Flowsheet Row Most Recent Value   Housing Stability    In the last 12 months, was there a time when you were not able to pay the mortgage or rent on time? N   In the past 12 months, how many times have you moved where you were living? 0   At any time in the past 12 months, were you homeless or living in a shelter (including now)? N   Transportation Needs    In the past 12 months, has  lack of transportation kept you from medical appointments or from getting medications? no   In the past 12 months, has lack of transportation kept you from meetings, work, or from getting things needed for daily living? No   Food Insecurity    Within the past 12 months, you worried that your food would run out before you got the money to buy more. Never true   Within the past 12 months, the food you bought just didn't last and you didn't have money to get more. Never true   Utilities    In the past 12 months has the electric, gas, oil, or water company threatened to shut off services in your home? No          No CM needs identified at this time. CM department to continue to follow for discharge planning needs throughout this admission.

## 2024-07-18 NOTE — UTILIZATION REVIEW
NOTIFICATION OF ADMISSION DISCHARGE   This is a Notification of Discharge from [facility]. Please be advised that this patient has been discharge from our facility. Below you will find the admission and discharge date and time including the patient’s disposition.   UTILIZATION REVIEW CONTACT:  Umm Jones  Utilization   Network Utilization Review Department  Phone: [phone] x carefully listen to the prompts. All voicemails are confidential.  Email: [email or web address]     ADMISSION INFORMATION  PRESENTATION DATE: 7/15/2024 12:12 PM  OBERVATION ADMISSION DATE: N/A  INPATIENT ADMISSION DATE: 7/15/24  1:11 PM   DISCHARGE DATE: 7/17/2024 12:53 PM   DISPOSITION:Home/Self Care    Network Utilization Review Department  ATTENTION: Please call with any questions or concerns to [phone] and carefully listen to the prompts so that you are directed to the right person. All voicemails are confidential.   For Discharge needs, contact Care Management DC Support Team at [phone] opt. 2  Send all requests for admission clinical reviews, approved or denied determinations and any other requests to dedicated fax number below belonging to the campus where the patient is receiving treatment. List of dedicated fax numbers for the Facilities:  FACILITY NAME UR FAX NUMBER   ADMISSION DENIALS (Administrative/Medical Necessity) [phone]   DISCHARGE SUPPORT TEAM ([facility]) [phone]   PARENT CHILD HEALTH (Maternity/NICU/Pediatrics) [phone]   [facility] [phone]   [facility] [phone]   [facility] [phone]   [facility] [phone]   [facility] [phone]   [facility] [phone]   [facility] [phone]   [facility]  [phone]   [facility] [phone]   [facility] [phone]   [facility] [phone]   [facility] [phone]      [facility]  Discharge- Tru Sy  1960, 63 y.o. male MRN: [id number]  Unit/Bed#: MS [facility]-01 Encounter: [id number]  Primary Care Provider: Agustin Ledbetter DO   Date and time admitted to hospital: 7/15/2024 12:12 PM     * Left leg cellulitis  Assessment & Plan  Patient with recent trauma on the left leg after he dropped a chainsaw on the left leg.  He had some abrasions and lesions few days later he was noted to have swelling and redness and presented to the emergency department.  He was seen in the emergency department 7/9, received tetanus injection and was discharged on oral Keflex.  Reported he did not improve with oral Keflex, he completed 6 days of treatment.  He noticed persistent left lower extremity swelling and blister/small ulcers on the left calf and some pain.  Patient reported he is left extremity is usually more swollen than the right.  Has a little bit of erythema on the left leg, mild weeping from the wounds  Does not meet sepsis criteria  Was started on cefepime and vancomycin in the emergency department.  No concern for DVT as the patient is on Coumadin      Patient requested to be discharged today   Discharged on Augmentin and Doxycycline for 5 days   Patient might have venous insufficiency on that side.  Recommend leg elevation.   Tylenol for pain  Continue wound care as recommended by wound specialist   Come back to er if worsening pain, redness, wound, chills or fever - patient verbalized understanding   F/u with wound care outpatient as needed            Chronic anticoagulation  Assessment & Plan  On Coumadin 7.5 mg 3 times a week and 5 mg rest of the days   Continue   INR between 2 and 3      Pulmonary embolism  (HCC)  Assessment & Plan  History of bilateral PE 2 times secondary to factor V Leiden  On Coumadin, continue      Benign essential hypertension  Assessment & Plan  On HCTZ 25 mg daily continue           Medical Problems         Resolved Problems  Date Reviewed: 3/28/2024   None         Discharging Physician / Practitioner: Radha Padilla MD  PCP: Agustin Ledbetter DO  Admission Date:   Admission Orders (From admission, onward)          Ordered         07/15/24 1311   INPATIENT ADMISSION  Once                               Discharge Date: 07/17/24     Consultations During Hospital Stay:  Wound care     Procedures Performed:   None     Significant Findings / Test Results:   none     Incidental Findings:   none     Test Results Pending at Discharge (will require follow up):   none     Outpatient Tests Requested:  none     Complications:  none     Reason for Admission: leg swelling and redness     Hospital Course:   Tru Sy Jr. is a 63 y.o. male patient with PMH of factor V Leiden and hypertension, bilateral PEs who originally presented to the hospital on 7/15/2024 due to  who presents with left leg swelling, erythema and pain. Patient reported he dropped the chainsaw on his left leg days ago, he had some wounds and redness, was seen in the emergency department on 7/9 and started on Keflex.  Patient almost completed 7 days of treatment except 1 day however he presented to emergency department today stating that his leg is still swollen, has some blisters and weeping, erythema and intermittent pain.  Was started on IV antibiotics and reported improvement.  He also was noted to have lower extremity edema mild on the right and worse on the left side.  He might have had DVTs on the right extremity.  Received Lasix IV x 2 to help with the swelling, improved.     On admission, patient refused any imaging to that leg, stating that he only has superficial wound      Patient discharged on antibiotics for 5 days.   "Follow-up with family doctor in 1 week.        Please see above list of diagnoses and related plan for additional information.      Condition at Discharge: good     Discharge Day Visit / Exam:   Subjective:  no symptoms   Vitals: Blood Pressure: 147/82 (07/17/24 0706)  Pulse: 65 (07/17/24 0706)  Temperature: (!) 97.3 °F (36.3 °C) (07/17/24 0706)  Temp Source: Temporal (07/17/24 0706)  Respirations: 20 (07/15/24 1329)  Height: 5' 3\" (160 cm) (07/15/24 1357)  Weight - Scale: 107 kg (235 lb 14.3 oz) (07/15/24 1357)  SpO2: 96 % (07/17/24 0706)  Exam:   Physical Exam  Vitals and nursing note reviewed.   Constitutional:       General: He is not in acute distress.     Appearance: He is not diaphoretic.   HENT:      Head: Normocephalic.   Eyes:      General: No scleral icterus.        Right eye: No discharge.         Left eye: No discharge.   Cardiovascular:      Rate and Rhythm: Normal rate and regular rhythm.      Heart sounds: No murmur heard.  Pulmonary:      Effort: Pulmonary effort is normal. No respiratory distress.      Breath sounds: Normal breath sounds. No wheezing, rhonchi or rales.   Abdominal:      General: There is no distension.      Palpations: Abdomen is soft.      Tenderness: There is no abdominal tenderness. There is no guarding or rebound.   Musculoskeletal:      Cervical back: Normal range of motion.      Right lower leg: No edema.      Left lower leg: Edema present.   Skin:     General: Skin is warm.   Neurological:      Mental Status: He is alert and oriented to person, place, and time.   Psychiatric:         Mood and Affect: Mood normal.         Behavior: Behavior normal.         Thought Content: Thought content normal.         Judgment: Judgment normal.            Discussion with Family: Patient declined call to .      Discharge instructions/Information to patient and family:   See after visit summary for information provided to patient and family.       Provisions for Follow-Up " Care:  See after visit summary for information related to follow-up care and any pertinent home health orders.       Mobility at time of Discharge:   Basic Mobility Inpatient Raw Score: 24  JH-HLM Goal: 8: Walk 250 feet or more  JH-HLM Achieved: 8: Walk 250 feet ot more  HLM Goal achieved. Continue to encourage appropriate mobility.     Disposition:   Home     Planned Readmission: no     Discharge Statement:  I spent 38 minutes discharging the patient. This time was spent on the day of discharge. I had direct contact with the patient on the day of discharge. Greater than 50% of the total time was spent examining patient, answering all patient questions, arranging and discussing plan of care with patient as well as directly providing post-discharge instructions.  Additional time then spent on discharge activities.     Discharge Medications:  See after visit summary for reconciled discharge medications provided to patient and/or family.       **Please Note: This note may have been constructed using a voice recognition system**

## 2024-07-20 LAB
BACTERIA BLD CULT: NORMAL
BACTERIA BLD CULT: NORMAL

## 2024-12-31 ENCOUNTER — APPOINTMENT (OUTPATIENT)
Dept: RADIOLOGY | Age: 64
End: 2024-12-31
Payer: MEDICARE

## 2024-12-31 ENCOUNTER — OFFICE VISIT (OUTPATIENT)
Dept: OBGYN CLINIC | Facility: CLINIC | Age: 64
End: 2024-12-31
Payer: OTHER MISCELLANEOUS

## 2024-12-31 VITALS — HEIGHT: 63 IN | BODY MASS INDEX: 37.74 KG/M2 | WEIGHT: 213 LBS

## 2024-12-31 DIAGNOSIS — Z96.651 S/P TOTAL KNEE REPLACEMENT, RIGHT: Primary | ICD-10-CM

## 2024-12-31 DIAGNOSIS — M17.12 PRIMARY OSTEOARTHRITIS OF LEFT KNEE: ICD-10-CM

## 2024-12-31 DIAGNOSIS — Z96.651 S/P TOTAL KNEE REPLACEMENT, RIGHT: ICD-10-CM

## 2024-12-31 PROCEDURE — 73562 X-RAY EXAM OF KNEE 3: CPT

## 2024-12-31 PROCEDURE — 99215 OFFICE O/P EST HI 40 MIN: CPT | Performed by: STUDENT IN AN ORGANIZED HEALTH CARE EDUCATION/TRAINING PROGRAM

## 2024-12-31 RX ORDER — MUPIROCIN 20 MG/G
OINTMENT TOPICAL 3 TIMES DAILY
Qty: 22 G | Refills: 0 | Status: SHIPPED | OUTPATIENT
Start: 2024-12-31

## 2024-12-31 RX ORDER — FERROUS SULFATE 324(65)MG
324 TABLET, DELAYED RELEASE (ENTERIC COATED) ORAL EVERY OTHER DAY
Qty: 15 TABLET | Refills: 0 | Status: SHIPPED | OUTPATIENT
Start: 2024-12-31 | End: 2025-01-30

## 2024-12-31 RX ORDER — MULTIVIT-MIN/IRON FUM/FOLIC AC 7.5 MG-4
1 TABLET ORAL DAILY
Qty: 30 TABLET | Refills: 0 | Status: SHIPPED | OUTPATIENT
Start: 2024-12-31 | End: 2025-01-30

## 2024-12-31 RX ORDER — CHLORHEXIDINE GLUCONATE 40 MG/ML
SOLUTION TOPICAL DAILY PRN
OUTPATIENT
Start: 2024-12-31

## 2024-12-31 RX ORDER — ASCORBIC ACID 500 MG
500 TABLET ORAL 2 TIMES DAILY
Qty: 60 TABLET | Refills: 0 | Status: SHIPPED | OUTPATIENT
Start: 2024-12-31 | End: 2025-01-30

## 2024-12-31 RX ORDER — ACETAMINOPHEN 325 MG/1
975 TABLET ORAL ONCE
OUTPATIENT
Start: 2024-12-31 | End: 2024-12-31

## 2024-12-31 RX ORDER — CHLORHEXIDINE GLUCONATE ORAL RINSE 1.2 MG/ML
15 SOLUTION DENTAL ONCE
OUTPATIENT
Start: 2024-12-31 | End: 2024-12-31

## 2024-12-31 RX ORDER — FOLIC ACID 1 MG/1
1 TABLET ORAL DAILY
Qty: 30 TABLET | Refills: 0 | Status: SHIPPED | OUTPATIENT
Start: 2024-12-31 | End: 2025-01-30

## 2024-12-31 NOTE — ASSESSMENT & PLAN NOTE
Patient is a Middle-Aged (Approx 40-64yo) male with with pain at rest or at night  , functional instability, no mechanical symptoms  and identified modifiable risk factors.  Radiographic evaluation demonstrates severe degenerative changes in all compartments. Physical exam reveals fixed varus and >5 flexion contracture. Given these findings, I recommend:     -Surgical treatment in the form of left total knee arthroplasty  -Thorough discussion was had the patient regarding the risks and benefits of continued nonsurgical versus surgical treatment for the left knee.  All questions were answered to the patient satisfaction.  Patient wishes to pursue surgical intervention at this time.  -Informed consent obtained in clinic today  -Patient will be seen 2 weeks postop

## 2024-12-31 NOTE — PROGRESS NOTES
Date: 24  Tru Sy Jr.   MRN# [id number]  : 1960      Chief Complaint: Post op right total knee arthroplasty    Assessment and Plan:    S/P total knee replacement, right  Patient is 1 year s/p right total knee arthroplasty  - WBAT RLE   - Tylenol and Celebrex for pain control prn  - No antibiotic dental prophylaxis is necessary  - No restrictions from our standpoint. Let pain be a guide  - RTC in 1 year      Primary osteoarthritis of left knee  Patient is a Middle-Aged (Approx 40-66yo) male with with pain at rest or at night  , functional instability, no mechanical symptoms  and identified modifiable risk factors.  Radiographic evaluation demonstrates severe degenerative changes in all compartments. Physical exam reveals fixed varus and >5 flexion contracture. Given these findings, I recommend:     -Surgical treatment in the form of left total knee arthroplasty  -Thorough discussion was had the patient regarding the risks and benefits of continued nonsurgical versus surgical treatment for the left knee.  All questions were answered to the patient satisfaction.  Patient wishes to pursue surgical intervention at this time.  -Informed consent obtained in clinic today  -Patient will be seen 2 weeks postop     TOTAL KNEE REPLACEMENT INDICATIONS AND RISKS    We had a lengthy discussion with the patient regarding the potential options for treatment. The patient has had an extensive conservative management course up until this time and therefore I do not feel that any additional conservative management will provide additional relief. The patient's symptoms have progressively worsened to the point where they now limit the patient's daily activities and quality of life.     At this time, I feel that this patient would be an excellent candidate for a total knee replacement. The patient has failed non-operative care and continues to have unacceptable symptoms. We discussed the treatment options and  alternatives and the risks and benefits of surgery in great detail.    While no guarantees can be made, total knee replacement has a very high success rate in terms of relieving a patient's knee pain and returning them to a more active, independent lifestyle for 10-15 years or more. All surgery carries some risk; for knee replacement, the complication rate is low but may include: death (very rare), infection, bleeding requiring transfusion, blood clots in legs traveling to lungs, nerve and/or blood vessel damage, bone fracture, persistent knee pain and/or stiffness, and repeat surgery(ies). The risk of a major complication is about 1-2 per 1000 cases. Total knee replacement should only be done if conservative treatment has failed. The revision rate for total knee replacements is about 1-2% per year; in other words, 85-95% of knee replacements may last 10 years; 75-85% last 20 years; and so on, assuming no injury to the knee. Finally we discussed anesthesia related complications which will be discussed in greater detail with the anesthesia team before surgery.     IF patient is a smoker, I discussed with patient about the importance of quitting smoking to decrease risk of infection postoperatively and promote good wound healing.    The patient was shown total knee booklets, diagrams and/or models and all of their questions have been answered at the present time. The patient may call or come in if they have any other concerns or questions. The patient also understands the post-operative rehabilitation process and the need for their cooperation and participation, and that their results may be compromised by their lack of compliance. The patient would like to proceed. Patient is encouraged to seek additional opinions if they so desire. The patient voiced their understanding of the surgical plan and potential complications and wishes to proceed with surgery.         Subjective:   Patient is 1 year s/p right primary total   knee arthroplasty.  Patient is very happy with his right knee and feels that he has no limitations at this time.  He continues to work as a contractor.  Patient does have a known history of severe, bone-on-bone osteoarthritis of the left knee which does now affect his daily activities.  He will consider surgical intervention at this time if able.    Date of procedure: 1/15/24  Doing well, no new problems  Pain progressively improving  Improved swelling  Ambulating with no assistive device    Allergy:  Allergies   Allergen Reactions    Statins Myalgia    Other Myalgia     Medications:  all current active meds have been reviewed    Past Medical History:  Past Medical History:   Diagnosis Date    Factor V Leiden (HCC)     H/O blood clots     pulmonary embolism    PONV (postoperative nausea and vomiting)      Past Surgical History:  Past Surgical History:   Procedure Laterality Date    FOOT SURGERY Right     KNEE SURGERY Right     SD ARTHRP KNE CONDYLE&PLATU MEDIAL&LAT COMPARTMENTS Right 1/15/2024    Procedure: ARTHROPLASTY KNEE TOTAL, RIGHT SAMEDAY DISCHARGE;  Surgeon: Rick Wood MD;  Location: [facility] OR;  Service: Orthopedics    SHOULDER SURGERY Bilateral      Family History:  Family History   Problem Relation Age of Onset    No Known Problems Mother     No Known Problems Father      Social History:  Social History     Substance and Sexual Activity   Alcohol Use Not Currently     Social History     Substance and Sexual Activity   Drug Use Never     Social History     Tobacco Use   Smoking Status Former    Current packs/day: 0.00    Types: Cigarettes    Quit date:     Years since quittin.0    Passive exposure: Never   Smokeless Tobacco Never           Review of Systems:  General- denies fever/chills  HEENT- denies hearing loss or sore throat  Eyes- denies eye pain or visual disturbances, denies red eyes  Respiratory- denies cough or SOB  Cardio- denies chest pain or palpitations  GI- denies  "abdominal pain  Endocrine- denies urinary frequency  Urinary- denies pain with urination  Musculoskeletal- Negative except noted above  Skin- denies rashes or wounds  Neurological- denies dizziness or headache  Psychiatric- denies anxiety or difficulty concentrating      Objective:   BP Readings from Last 1 Encounters:   07/17/24 147/82      Wt Readings from Last 1 Encounters:   12/31/24 96.6 kg (213 lb)      Pulse Readings from Last 1 Encounters:   07/17/24 65        BMI: Estimated body mass index is 37.73 kg/m² as calculated from the following:    Height as of this encounter: 5' 3\" (1.6 m).    Weight as of this encounter: 96.6 kg (213 lb).      Physical Exam  Ht 5' 3\" (1.6 m)   Wt 96.6 kg (213 lb)   BMI 37.73 kg/m²   General/Constitutional: No apparent distress: well-nourished and well developed.  Eyes: normal ocular motion  Cardio: RRR, Normal S1S2, No m/r/g.   Lymphatic: No appreciable lymphadenopathy  Respiratory: Non-labored breathing, CTA b/l no w/c/r  Vascular: No edema, swelling or tenderness, except as noted in detailed exam. Extremities well perfused. No LE edema  Integumentary: No impressive skin lesions present, except as noted in detailed exam.  Neuro: No ataxia or tremors noted  Psych: Normal mood and affect, oriented to person, place and time. Appropriate affect.  Musculoskeletal: Normal, except as noted in detailed exam and in HPI.    Gait and Station:   normal    right Knee Examination  Incision: clean, dry, and intact  Effusion: none  Alignment: normal  AP Stability: stable  Coronal Stability  Extension:stable  Mid-flexion: stable  90 degrees flexion: stable  Range of motion  Active: 0 to 120    Passive: 0 to 120  Extensor lag: Absent  Motor: 5/5 EHL/FHL/TA/GS/QD/HS  Neurovascular exam is intact.   No evidence of calf tightness or posterior cords    Left knee examination  Skin clean, dry and intact  Resting varus, not correctable  Mild effusion  Medial and lateral joint line tenderness  Range of " motion 5-1 20 with pain  Extensor lag: Absent  Motor: 5/5 EHL/FHL/TA/GS/QD/HS  Neurovascular exam is intact.   No evidence of calf tightness or posterior cords    Images:  I personally reviewed relevant images in the PACS system and my interpretation is as follows:  X-rays of the right knee reveal a total  knee arthroplasty in appropriate alignment without evidence of migration, wear or loosening.    Previously obtained radiographs of the left knee were reviewed and demonstrate severe osteoarthritis with bone-on-bone articulation to the medial compartment, subchondral sclerosis, osteophyte formation and subchondral cysts.      Scribe Attestation      I,:  Ralf Hilliard PA-C am acting as a scribe while in the presence of the attending physician.:       I,:  Rick Wood MD personally performed the services described in this documentation    as scribed in my presence.:               Rick Wood MD  Adult Reconstruction Specialist   [facility]

## 2024-12-31 NOTE — ASSESSMENT & PLAN NOTE
Patient is 1 year s/p right total knee arthroplasty  - WBAT RLE   - Tylenol and Celebrex for pain control prn  - No antibiotic dental prophylaxis is necessary  - No restrictions from our standpoint. Let pain be a guide  - RTC in 1 year

## 2025-01-06 ENCOUNTER — TELEPHONE (OUTPATIENT)
Age: 65
End: 2025-01-06

## 2025-01-06 NOTE — TELEPHONE ENCOUNTER
Caller: Patient    Doctor: Nina    Reason for call: Asked to speak w/Sx Coordinator  Re: sx date    Call back#: [phone]

## 2025-01-06 NOTE — TELEPHONE ENCOUNTER
Spoke with patient. He stated that he canceled Cardiac Clearance appt that I made for him ([facility]) because he wanted specific provider. I told patient that I call call Cardiac office to reschedule.    Called [facility] but was unable to make appt with either Dr. Ledbetter or Dr. Moise. The staff member at [facility] said they will call patient to explain this and try to make appt with another provider from the practice.

## 2025-01-27 ENCOUNTER — TELEPHONE (OUTPATIENT)
Dept: OBGYN CLINIC | Facility: HOSPITAL | Age: 65
End: 2025-01-27

## 2025-01-27 ENCOUNTER — TELEPHONE (OUTPATIENT)
Age: 65
End: 2025-01-27

## 2025-01-27 NOTE — TELEPHONE ENCOUNTER
Preoperative Elective Admission Assessment     Living Situation:    Who does pt live with: lives alone  What kind of home: apartment  How do they enter the home: front  How many levels in home: 1   # of steps to enter home: 4  # of steps to second floor: n/a  Are there handrails: Yes  Are there landings: No  Sleeping arrangement: first/entry floor  Where is Bathroom: entry level  Where is the tub or shower: Step in bath tub with grab bar, denies shower chair   Dogs or ther pets: n/a     First Floor Setup:   Is there a bathroom: Yes  Where would pt sleep: bed     DME: grab bars, rolling walker, and cane. Instructed to bring RW DOS  We discussed clearing pathways in the home and making sure there is accessibly to use the walker, for example, removing throw rugs.      Patient's Current Level of Function: Ambulates: Independently and ADLs: Independent     Post-op Caregiver: friend  Caregiver Name and phone number for Inpatient discharge needs: Srikanth  Currently receive any HHC/aides/community supports: No     Post-op Transport:   To/from hospital:   To/from PT 2-3x/week:   Uses community transport now: No     Outpatient Physical Therapy Site:  Site: Not ordered by surgeon   pre and post-op appts scheduled? No     Medication Management: self and out of bottle  Preferred Pharmacy for Post-op Medications: [facility] [address]  Blood Management Vitamin Regimen:  starting 30 days prior to surgery   Post-op anticoagulant: to be determined by surgical team postoperatively    Discussed preop bathing and Bactroban usage     DC Plan: Pt plans to be discharged home        Barriers to DC identified preoperatively: none identified     BMI: 37.73     Patient Education:  Pt educated on post-op pain, early mobilization (POD0), LOS goals, OP PT goals, and preoperative bathing. Patient educated that our goal is to appropriately discharge patient based off their post-op function while striving to maintain maximal  independence. The goal is to discharge patient to home and for them to attend outpatient physical therapy.     Assigned to care team? Yes

## 2025-01-27 NOTE — TELEPHONE ENCOUNTER
Caller: Kaya [employer]    Doctor: Sherry    Reason for call: calling to request fax #, provided #    Call back#: n/a

## 2025-01-27 NOTE — TELEPHONE ENCOUNTER
Caller: Kaya [employer]    Doctor: Nina    Reason for call: Asking for dates of last appts and surgery dates.

## 2025-01-29 ENCOUNTER — TELEPHONE (OUTPATIENT)
Dept: OBGYN CLINIC | Facility: CLINIC | Age: 65
End: 2025-01-29

## 2025-01-29 NOTE — TELEPHONE ENCOUNTER
Patient called to inform L knee surgery should be covered under new WC claim [id number]. Patient provided all WC information. Chart will be updated. Prior auth team will be informed.

## 2025-02-10 ENCOUNTER — APPOINTMENT (OUTPATIENT)
Dept: LAB | Facility: HOSPITAL | Age: 65
End: 2025-02-10
Payer: OTHER MISCELLANEOUS

## 2025-02-10 DIAGNOSIS — M17.12 PRIMARY OSTEOARTHRITIS OF LEFT KNEE: ICD-10-CM

## 2025-02-10 LAB
ALBUMIN SERPL BCG-MCNC: 4 G/DL (ref 3.5–5)
ALP SERPL-CCNC: 80 U/L (ref 34–104)
ALT SERPL W P-5'-P-CCNC: 25 U/L (ref 7–52)
ANION GAP SERPL CALCULATED.3IONS-SCNC: 8 MMOL/L (ref 4–13)
APTT PPP: 39 SECONDS (ref 23–34)
AST SERPL W P-5'-P-CCNC: 24 U/L (ref 13–39)
BASOPHILS # BLD AUTO: 0.04 THOUSANDS/ÂΜL (ref 0–0.1)
BASOPHILS NFR BLD AUTO: 0 % (ref 0–1)
BILIRUB SERPL-MCNC: 0.88 MG/DL (ref 0.2–1)
BUN SERPL-MCNC: 19 MG/DL (ref 5–25)
CALCIUM SERPL-MCNC: 9.9 MG/DL (ref 8.4–10.2)
CHLORIDE SERPL-SCNC: 100 MMOL/L (ref 96–108)
CO2 SERPL-SCNC: 31 MMOL/L (ref 21–32)
CREAT SERPL-MCNC: 0.97 MG/DL (ref 0.6–1.3)
EOSINOPHIL # BLD AUTO: 0.2 THOUSAND/ÂΜL (ref 0–0.61)
EOSINOPHIL NFR BLD AUTO: 2 % (ref 0–6)
ERYTHROCYTE [DISTWIDTH] IN BLOOD BY AUTOMATED COUNT: 14.4 % (ref 11.6–15.1)
EST. AVERAGE GLUCOSE BLD GHB EST-MCNC: 131 MG/DL
GFR SERPL CREATININE-BSD FRML MDRD: 81 ML/MIN/1.73SQ M
GLUCOSE P FAST SERPL-MCNC: 113 MG/DL (ref 65–99)
HBA1C MFR BLD: 6.2 %
HCT VFR BLD AUTO: 45.6 % (ref 36.5–49.3)
HGB BLD-MCNC: 14.4 G/DL (ref 12–17)
IMM GRANULOCYTES # BLD AUTO: 0.03 THOUSAND/UL (ref 0–0.2)
IMM GRANULOCYTES NFR BLD AUTO: 0 % (ref 0–2)
INR PPP: 2.44 (ref 0.85–1.19)
LYMPHOCYTES # BLD AUTO: 1.55 THOUSANDS/ÂΜL (ref 0.6–4.47)
LYMPHOCYTES NFR BLD AUTO: 16 % (ref 14–44)
MCH RBC QN AUTO: 28.1 PG (ref 26.8–34.3)
MCHC RBC AUTO-ENTMCNC: 31.6 G/DL (ref 31.4–37.4)
MCV RBC AUTO: 89 FL (ref 82–98)
MONOCYTES # BLD AUTO: 0.87 THOUSAND/ÂΜL (ref 0.17–1.22)
MONOCYTES NFR BLD AUTO: 9 % (ref 4–12)
NEUTROPHILS # BLD AUTO: 6.77 THOUSANDS/ÂΜL (ref 1.85–7.62)
NEUTS SEG NFR BLD AUTO: 73 % (ref 43–75)
NRBC BLD AUTO-RTO: 0 /100 WBCS
PLATELET # BLD AUTO: 214 THOUSANDS/UL (ref 149–390)
PMV BLD AUTO: 12.1 FL (ref 8.9–12.7)
POTASSIUM SERPL-SCNC: 4.2 MMOL/L (ref 3.5–5.3)
PROT SERPL-MCNC: 7.5 G/DL (ref 6.4–8.4)
PROTHROMBIN TIME: 26.8 SECONDS (ref 12.3–15)
RBC # BLD AUTO: 5.13 MILLION/UL (ref 3.88–5.62)
SODIUM SERPL-SCNC: 139 MMOL/L (ref 135–147)
WBC # BLD AUTO: 9.46 THOUSAND/UL (ref 4.31–10.16)

## 2025-02-10 PROCEDURE — 83036 HEMOGLOBIN GLYCOSYLATED A1C: CPT

## 2025-02-10 PROCEDURE — 87081 CULTURE SCREEN ONLY: CPT

## 2025-02-10 PROCEDURE — 85025 COMPLETE CBC W/AUTO DIFF WBC: CPT

## 2025-02-10 PROCEDURE — 86850 RBC ANTIBODY SCREEN: CPT | Performed by: STUDENT IN AN ORGANIZED HEALTH CARE EDUCATION/TRAINING PROGRAM

## 2025-02-10 PROCEDURE — 85730 THROMBOPLASTIN TIME PARTIAL: CPT

## 2025-02-10 PROCEDURE — 85610 PROTHROMBIN TIME: CPT

## 2025-02-10 PROCEDURE — 86900 BLOOD TYPING SEROLOGIC ABO: CPT | Performed by: STUDENT IN AN ORGANIZED HEALTH CARE EDUCATION/TRAINING PROGRAM

## 2025-02-10 PROCEDURE — 80053 COMPREHEN METABOLIC PANEL: CPT

## 2025-02-10 PROCEDURE — 36415 COLL VENOUS BLD VENIPUNCTURE: CPT

## 2025-02-10 PROCEDURE — 86901 BLOOD TYPING SEROLOGIC RH(D): CPT | Performed by: STUDENT IN AN ORGANIZED HEALTH CARE EDUCATION/TRAINING PROGRAM

## 2025-02-11 ENCOUNTER — LAB REQUISITION (OUTPATIENT)
Dept: LAB | Facility: HOSPITAL | Age: 65
End: 2025-02-11
Payer: MEDICARE

## 2025-02-11 DIAGNOSIS — M17.12 UNILATERAL PRIMARY OSTEOARTHRITIS, LEFT KNEE: ICD-10-CM

## 2025-02-11 LAB
ABO GROUP BLD: NORMAL
BLD GP AB SCN SERPL QL: NEGATIVE
MRSA NOSE QL CULT: NORMAL
RH BLD: POSITIVE
SPECIMEN EXPIRATION DATE: NORMAL

## 2025-02-11 NOTE — ASSESSMENT & PLAN NOTE
Warfarin managed by cardiology  Cleared 2/17/2025   Directions as follows:  He should hold his warfarin 2 days prior to surgery, this will likely drift his INR to below 2. He can get labs the day of surgery. The patient should be bridged postoperatively with low molecular weight heparin until therapeutic INR with coumadin of 2-3   Cleared by cardiology

## 2025-02-11 NOTE — PATIENT INSTRUCTIONS
"Please follow instructions from cardiology regarding warfarin and bridging both pre and post operatively: \"He should hold his warfarin 2 days prior to surgery, this will likely drift his INR to below 2. He can get labs the day of surgery. The patient should be bridged postoperatively with low molecular weight heparin until therapeutic INR with coumadin of 2-3\"    BEFORE SURGERY    Contact your surgical nurse navigator or surgical provider with any questions regarding preoperative plan or schedule.  Stop all over the counter supplements, herbal, naturopathic  medications for 1 week prior to surgery UNLESS prescribed by your surgeon  Hold NSAIDS (i.e. advil, alleve, motrin, ibuprofen, celebrex) minimum 5 days prior to surgery  Follow presurgical medication instructions provided by preadmission nursing team reviewed during your presurgery phone call  Strategies for optimizing your surgery through breathing exercises, nutrition and physical activity can be found at [email or web address]  Call [phone] with any presurgical concerns or medications questions or use the messaging feature in your [employer] darren to contact your provider    AFTER SURGERY    Recommend using Tylenol ( acetaminophen ) 1000 mg every eight hours during the first week post discharge along with icing the area for 20 mins every 3-4 hours while awake can be helpful in reducing your need for post operative opioid use. This opioid sparing plan can be used along side your surgeons pain plan.  Use stool softener over the counter (colace) daily after surgery during the first 1-2 weeks to avoid post operative constipation issues  If no bowel movement within 3 days after surgery then use over the counter Miralax in addition to your stool softener   If cleared by your surgical team for activity then early and often walking is encouraged and can be important in prevention of post surgical blood clots. Additionally spend as much time out of bed as possible and " allowed by your surgical team  Use your incentive spirometer twice per hour in the first seven days after surgery to help prevent post surgery lung complications and infections  It is very important you follow the instructions from your surgeon regarding any medications for after surgery blood clot prevention. Compliance with these medications or interventions is very important.  Call [phone] with any post discharge concerns or medical issues or use the messaging feature in your [employer] darren to contact your provider

## 2025-02-11 NOTE — PROGRESS NOTES
Internal Medicine Pre-Operative Evaluation:     Reason for Visit: Pre-operative Evaluation for Risk Stratification and Optimization    Patient ID: Tru Sy Jr. is a 64 y.o. male.     Case: [id number] Date/Time: 03/05/25 1345   Procedure: ARTHROPLASTY KNEE TOTAL, LEFT (Left: Knee)   Anesthesia type: Spinal   Diagnosis: Primary osteoarthritis of left knee [M17.12]   Pre-op diagnosis: Primary osteoarthritis of left knee [M17.12]   Location: [facility] ROOM 03 / [facility]   Surgeons: Rick Wood MD         Recommendations to Proceed withSurgery    Patient is considered to be Low risk for Medium risk procedure.     After evaluation and discussion with patient with emphasis that all surgery has some degree of inherent risk it is acknowledged by patient this risk is Acceptable.    Patient is optimized and may proceed with planned procedure.     Assessment    Pre-operative Medical Evaluation for planned surgery  Recommendations as listed in PLAN section below  Contact surgical nurse  navigator with any questions regarding preoperative plan or schedule.      Assessment & Plan  Preoperative clearance    Primary osteoarthritis of left knee  Failed outpatient conservative measures  Electing to undergo arthroplasty    S/P total knee replacement, right  2024  No post operative issues  Factor V Leiden (HCC)  Warfarin managed by cardiology  Cleared 2/17/2025   Directions as follows:  He should hold his warfarin 2 days prior to surgery, this will likely drift his INR to below 2. He can get labs the day of surgery. The patient should be bridged postoperatively with low molecular weight heparin until therapeutic INR with coumadin of 2-3   Cleared by cardiology  Benign essential hypertension  Stable  Monitor post operative BP   Avoid hypotension if at all possible  Refer to PAT instructions regarding medication administration the morning of surgery    Class 2 obesity due to excess  calories without serious comorbidity with body mass index (BMI) of 38.0 to 38.9 in adult  Recommend ongoing attempts at weight loss  Current BMI meets criteria of <40 per MLJ preoperative qualifications    H/O blood clots  AC indefinitely  Has had both DVT and PE           Plan:     1. Further preoperative workup as follows:   - none no further testing required may proceed with surgery    2. Preoperative Medication Management Review performed by PAT nursing  YES    3. Patient requires further consultation with:   No Consults Required    4. Discharge Planning / Barriers to Discharge  none identified - patients has post discharge therapy plan in place, transportation arranged for discharge day, adequate family support at home to assist with discharge to home.        Subjective:           History of Present Illness:     Tru Sy Jr. is a 64 y.o. male who presents to the office today for a preoperative consultation at the request of surgeon. The patient understands this is an elective procedure and not emergent. They are electing to undergo planned procedure with an understanding that all surgery has inherent risk. They have worked with their surgeon and failed conservative treatment measures. Today they present for preoperative risk assessment and recommendations for optimization in preparation for surgery.    Pt seen in center for perioperative medicine for upcoming proposed surgery. They have failed previous conservative measures and have elected surgical intervention.     Pt meets presurgical lab and BMI optimization goals.    Pt had cardiology clearance 2/17/2025 was cleared and was given specific warfarin directions along with post operative bridging instructions. He is at high risk for clots post operatively d/t Factor V      Tru Sy Jr. has an IN HOSPITAL cardiac risk of RCI RISK CLASS II (1 risk factor, risk of major cardiac compl. appr. 1.3%) based on RCRI calculator    Cardiac Risk Estimation:  "per the Revised Cardiac Risk Index (Circ. 100:1043, 1999),         Pre-op Exam    Previous history of bleeding disorders or clots?: Yes  Previous Anesthesia reaction?: No  Prolonged steroid use in the last 6 months?: No    Assessment of Cardiac Risk:   - Unstable or severe angina or MI in the last 6 weeks or history of stent placement in the last year?: No   - Decompensated heart failure (e.g. New onset heart failure, NYHA  Class IV heart failure, or worsening existing heart failure)?: No  - Significant arrhythmias such as high grade AV block, symptomatic ventricular arrhythmia, newly recognized ventricular tachycardia, supraventricular tachycardia with resting heart rate >100, or symptomatic bradycardia?: No  - Severe heart valve disease including aortic stenosis or symptomatic mitral stenosis?: No      Pre-operative Risk Factors:  Elevated-risk surgery: No    History of cerebrovascular disease: No    History of ischemic heart disease: No  Pre-operative treatment with insulin: No  Pre-operative creatinine >2 mg/dL: No    History of congestive heart failure: No    Duke Activity Status Index (DASI):   DASI Total Score: 18.95  METs: 5.1        ROS: No TIA's or unusual headaches, no dysphagia.  No prolonged cough. No dyspnea or chest pain on exertion.  No abdominal pain, change in bowel habits, black or bloody stools.  No urinary tract or BPH symptoms.  Positive reported pain in arthritic joint. Positive difficulty with gait. No skin rashes or issues.      Objective:    /82 (BP Location: Left arm, Patient Position: Sitting, Cuff Size: Standard)   Pulse 67   Ht 5' 3\" (1.6 m)   SpO2 98%   BMI 37.20 kg/m²       General Appearance: no distress, conversive  HEENT: PERRLA, conjuctiva normal; oropharynx clear; mucous membranes moist;   Neck:  Supple, no lymphadenopathy or thyromegaly  Lungs: breath sounds normal, normal respiratory effort, no retractions, expiratory effort normal  CV: normal heart sounds S1/S2, PMI " normal   ABD: soft non tender, +BSx4  EXT: DP pulses intact, no lymphadenopathy, no edema  Skin: normal turgor, normal texture, no rash  Psych: affect normal, mood normal  Neuro: AAOx3        The following portions of the patient's history were reviewed and updated as appropriate: allergies, current medications, past family history, past medical history, past social history, past surgical history and problem list.     Past History:       Past Medical History:   Diagnosis Date    Arthritis     Factor V Leiden (HCC)     H/O blood clots     pulmonary embolism    Hypertension     PONV (postoperative nausea and vomiting)     Seasonal allergies     Past Surgical History:   Procedure Laterality Date    FOOT SURGERY Right     KNEE SURGERY Right 2009    ME ARTHRP KNE CONDYLE&PLATU MEDIAL&LAT COMPARTMENTS Right 1/15/2024    Procedure: ARTHROPLASTY KNEE TOTAL, RIGHT SAMEDAY DISCHARGE;  Surgeon: Rick Wood MD;  Location: [facility];  Service: Orthopedics    SHOULDER SURGERY Bilateral           Social History     Tobacco Use    Smoking status: Former     Current packs/day: 0.00     Average packs/day: 1 pack/day for 16.0 years (16.0 ttl pk-yrs)     Types: Cigarettes     Start date:      Quit date:      Years since quittin.1     Passive exposure: Never    Smokeless tobacco: Never   Vaping Use    Vaping status: Never Used   Substance Use Topics    Alcohol use: Not Currently    Drug use: Never     Family History   Problem Relation Age of Onset    No Known Problems Mother     No Known Problems Father           Allergies:     Allergies   Allergen Reactions    Statins Myalgia    Other Myalgia        Current Medications:     Current Outpatient Medications   Medication Instructions    ascorbic acid (VITAMIN C) 500 mg, Oral, 2 times daily    docusate sodium (COLACE) 100 mg, Oral, 2 times daily    [START ON 3/5/2025] enoxaparin (LOVENOX) 150 mg/mL injection Inject under the skin daily for five doses.    ferrous sulfate  "324 mg, Oral, Every other day    folic acid (FOLVITE) 1 mg, Oral, Daily    hydroCHLOROthiazide 25 mg, Daily    Multiple Vitamins-Minerals (multivitamin with minerals) tablet 1 tablet, Oral, Daily    mupirocin (BACTROBAN) 2 % ointment Topical, 3 times daily    warfarin (COUMADIN) 5 mg           PRE-OP WORKSHEET DATA    Assessment of Pre-Operative Risks     MLJ Quality Hard Stops:    BMI (<40) : Estimated body mass index is 37.2 kg/m² as calculated from the following:    Height as of this encounter: 5' 3\" (1.6 m).    Weight as of 2/12/25: 95.3 kg (210 lb).    Hgb ( >11):   Lab Results   Component Value Date    HGB 14.4 02/10/2025    HGB 14.1 07/17/2024    HGB 13.8 07/16/2024       HbA1c (<7.5) :   Lab Results   Component Value Date    HGBA1C 6.2 (H) 02/10/2025       GFR (>60) (Less then 45 = Nephrology consult):    Lab Results   Component Value Date    EGFR 81 02/10/2025    EGFR 86 07/17/2024    EGFR 90 07/16/2024            Pre-Op Data Reviewed:       Laboratory Results: I have personally reviewed the pertinent reports    EKG: I personally reviewed and interpreted available tracings in the electronic medical record    2/17/2025=EKG performed today reviewed and interpreted by myself:   NSR with RBBB     OLD RECORDS: reviewed old records in the chart review section if EHR on day of visit.    Previous cardiopulmonary studies within the past year:  Echocardiogram: no   Cardiac Catheterization: no  Stress Test: no      Time of visit including pre-visit chart review, visit and post-visit coordination of plan and care , review of pre-surgical lab work, preparation and time spent documenting note in electronic medical record, time spent face-to-face in physical examination answering patient questions by care team 35 minutes             [facility]    "

## 2025-02-11 NOTE — H&P (VIEW-ONLY)
Internal Medicine Pre-Operative Evaluation:     Reason for Visit: Pre-operative Evaluation for Risk Stratification and Optimization    Patient ID: Tru Sy Jr. is a 64 y.o. male.     Case: [id number] Date/Time: 03/05/25 1345   Procedure: ARTHROPLASTY KNEE TOTAL, LEFT (Left: Knee)   Anesthesia type: Spinal   Diagnosis: Primary osteoarthritis of left knee [M17.12]   Pre-op diagnosis: Primary osteoarthritis of left knee [M17.12]   Location: [facility]OM 03 / [facility]   Surgeons: Rick Wood MD         Recommendations to Proceed withSurgery    Patient is considered to be Low risk for Medium risk procedure.     After evaluation and discussion with patient with emphasis that all surgery has some degree of inherent risk it is acknowledged by patient this risk is Acceptable.    Patient is optimized and may proceed with planned procedure.     Assessment    Pre-operative Medical Evaluation for planned surgery  Recommendations as listed in PLAN section below  Contact surgical nurse  navigator with any questions regarding preoperative plan or schedule.      Assessment & Plan  Preoperative clearance    Primary osteoarthritis of left knee  Failed outpatient conservative measures  Electing to undergo arthroplasty    S/P total knee replacement, right  2024  No post operative issues  Factor V Leiden (HCC)  Warfarin managed by cardiology  Cleared 2/17/2025   Directions as follows:  He should hold his warfarin 2 days prior to surgery, this will likely drift his INR to below 2. He can get labs the day of surgery. The patient should be bridged postoperatively with low molecular weight heparin until therapeutic INR with coumadin of 2-3   Cleared by cardiology  Benign essential hypertension  Stable  Monitor post operative BP   Avoid hypotension if at all possible  Refer to PAT instructions regarding medication administration the morning of surgery    Class 2 obesity due to excess  calories without serious comorbidity with body mass index (BMI) of 38.0 to 38.9 in adult  Recommend ongoing attempts at weight loss  Current BMI meets criteria of <40 per MLJ preoperative qualifications    H/O blood clots  AC indefinitely  Has had both DVT and PE           Plan:     1. Further preoperative workup as follows:   - none no further testing required may proceed with surgery    2. Preoperative Medication Management Review performed by PAT nursing  YES    3. Patient requires further consultation with:   No Consults Required    4. Discharge Planning / Barriers to Discharge  none identified - patients has post discharge therapy plan in place, transportation arranged for discharge day, adequate family support at home to assist with discharge to home.        Subjective:           History of Present Illness:     Tru Sy Jr. is a 64 y.o. male who presents to the office today for a preoperative consultation at the request of surgeon. The patient understands this is an elective procedure and not emergent. They are electing to undergo planned procedure with an understanding that all surgery has inherent risk. They have worked with their surgeon and failed conservative treatment measures. Today they present for preoperative risk assessment and recommendations for optimization in preparation for surgery.    Pt seen in center for perioperative medicine for upcoming proposed surgery. They have failed previous conservative measures and have elected surgical intervention.     Pt meets presurgical lab and BMI optimization goals.    Pt had cardiology clearance 2/17/2025 was cleared and was given specific warfarin directions along with post operative bridging instructions. He is at high risk for clots post operatively d/t Factor V      Tru Sy Jr. has an IN HOSPITAL cardiac risk of RCI RISK CLASS II (1 risk factor, risk of major cardiac compl. appr. 1.3%) based on RCRI calculator    Cardiac Risk Estimation:  "per the Revised Cardiac Risk Index (Circ. 100:1043, 1999),         Pre-op Exam    Previous history of bleeding disorders or clots?: Yes  Previous Anesthesia reaction?: No  Prolonged steroid use in the last 6 months?: No    Assessment of Cardiac Risk:   - Unstable or severe angina or MI in the last 6 weeks or history of stent placement in the last year?: No   - Decompensated heart failure (e.g. New onset heart failure, NYHA  Class IV heart failure, or worsening existing heart failure)?: No  - Significant arrhythmias such as high grade AV block, symptomatic ventricular arrhythmia, newly recognized ventricular tachycardia, supraventricular tachycardia with resting heart rate >100, or symptomatic bradycardia?: No  - Severe heart valve disease including aortic stenosis or symptomatic mitral stenosis?: No      Pre-operative Risk Factors:  Elevated-risk surgery: No    History of cerebrovascular disease: No    History of ischemic heart disease: No  Pre-operative treatment with insulin: No  Pre-operative creatinine >2 mg/dL: No    History of congestive heart failure: No    Duke Activity Status Index (DASI):   DASI Total Score: 18.95  METs: 5.1        ROS: No TIA's or unusual headaches, no dysphagia.  No prolonged cough. No dyspnea or chest pain on exertion.  No abdominal pain, change in bowel habits, black or bloody stools.  No urinary tract or BPH symptoms.  Positive reported pain in arthritic joint. Positive difficulty with gait. No skin rashes or issues.      Objective:    /82 (BP Location: Left arm, Patient Position: Sitting, Cuff Size: Standard)   Pulse 67   Ht 5' 3\" (1.6 m)   SpO2 98%   BMI 37.20 kg/m²       General Appearance: no distress, conversive  HEENT: PERRLA, conjuctiva normal; oropharynx clear; mucous membranes moist;   Neck:  Supple, no lymphadenopathy or thyromegaly  Lungs: breath sounds normal, normal respiratory effort, no retractions, expiratory effort normal  CV: normal heart sounds S1/S2, PMI " normal   ABD: soft non tender, +BSx4  EXT: DP pulses intact, no lymphadenopathy, no edema  Skin: normal turgor, normal texture, no rash  Psych: affect normal, mood normal  Neuro: AAOx3        The following portions of the patient's history were reviewed and updated as appropriate: allergies, current medications, past family history, past medical history, past social history, past surgical history and problem list.     Past History:       Past Medical History:   Diagnosis Date    Arthritis     Factor V Leiden (HCC)     H/O blood clots     pulmonary embolism    Hypertension     PONV (postoperative nausea and vomiting)     Seasonal allergies     Past Surgical History:   Procedure Laterality Date    FOOT SURGERY Right     KNEE SURGERY Right 2009    [place] ARTHRP KNE CONDYLE&PLATU MEDIAL&LAT COMPARTMENTS Right 1/15/2024    Procedure: ARTHROPLASTY KNEE TOTAL, RIGHT SAMEDAY DISCHARGE;  Surgeon: Rick Wood MD;  Location: [facility];  Service: Orthopedics    SHOULDER SURGERY Bilateral           Social History     Tobacco Use    Smoking status: Former     Current packs/day: 0.00     Average packs/day: 1 pack/day for 16.0 years (16.0 ttl pk-yrs)     Types: Cigarettes     Start date:      Quit date:      Years since quittin.1     Passive exposure: Never    Smokeless tobacco: Never   Vaping Use    Vaping status: Never Used   Substance Use Topics    Alcohol use: Not Currently    Drug use: Never     Family History   Problem Relation Age of Onset    No Known Problems Mother     No Known Problems Father           Allergies:     Allergies   Allergen Reactions    Statins Myalgia    Other Myalgia        Current Medications:     Current Outpatient Medications   Medication Instructions    ascorbic acid (VITAMIN C) 500 mg, Oral, 2 times daily    docusate sodium (COLACE) 100 mg, Oral, 2 times daily    [START ON 3/5/2025] enoxaparin (LOVENOX) 150 mg/mL injection Inject under the skin daily for five doses.    ferrous sulfate  "324 mg, Oral, Every other day    folic acid (FOLVITE) 1 mg, Oral, Daily    hydroCHLOROthiazide 25 mg, Daily    Multiple Vitamins-Minerals (multivitamin with minerals) tablet 1 tablet, Oral, Daily    mupirocin (BACTROBAN) 2 % ointment Topical, 3 times daily    warfarin (COUMADIN) 5 mg           PRE-OP WORKSHEET DATA    Assessment of Pre-Operative Risks     MLJ Quality Hard Stops:    BMI (<40) : Estimated body mass index is 37.2 kg/m² as calculated from the following:    Height as of this encounter: 5' 3\" (1.6 m).    Weight as of 2/12/25: 95.3 kg (210 lb).    Hgb ( >11):   Lab Results   Component Value Date    HGB 14.4 02/10/2025    HGB 14.1 07/17/2024    HGB 13.8 07/16/2024       HbA1c (<7.5) :   Lab Results   Component Value Date    HGBA1C 6.2 (H) 02/10/2025       GFR (>60) (Less then 45 = Nephrology consult):    Lab Results   Component Value Date    EGFR 81 02/10/2025    EGFR 86 07/17/2024    EGFR 90 07/16/2024            Pre-Op Data Reviewed:       Laboratory Results: I have personally reviewed the pertinent reports    EKG: I personally reviewed and interpreted available tracings in the electronic medical record    2/17/2025=EKG performed today reviewed and interpreted by myself:   NSR with RBBB     OLD RECORDS: reviewed old records in the chart review section if EHR on day of visit.    Previous cardiopulmonary studies within the past year:  Echocardiogram: no   Cardiac Catheterization: no  Stress Test: no      Time of visit including pre-visit chart review, visit and post-visit coordination of plan and care , review of pre-surgical lab work, preparation and time spent documenting note in electronic medical record, time spent face-to-face in physical examination answering patient questions by care team 35 minutes             [facility]    "

## 2025-02-12 NOTE — PRE-PROCEDURE INSTRUCTIONS
Pre-Surgery Instructions:   Medication Instructions    ascorbic acid (VITAMIN C) 500 MG tablet Hold day of surgery.    docusate sodium (COLACE) 100 mg capsule Take day of surgery.    ferrous sulfate 324 (65 Fe) mg Hold day of surgery.    folic acid (FOLVITE) 1 mg tablet Hold day of surgery.    hydrochlorothiazide (HYDRODIURIL) 25 mg tablet Hold day of surgery.    Multiple Vitamins-Minerals (multivitamin with minerals) tablet Hold day of surgery.    mupirocin (BACTROBAN) 2 % ointment Take day of surgery.    warfarin (COUMADIN) 5 mg tablet Instructions provided by MD Follow instructions provided by Cardiologist    Medication instructions for day surgery reviewed. Please use only a sip of water to take your instructed medications. Avoid all over the counter vitamins, supplements and NSAIDS for one week prior to surgery per anesthesia guidelines. Tylenol is ok to take as needed.     You will receive a call one business day prior to surgery with an arrival time and hospital directions. If your surgery is scheduled on a Monday, the hospital will be calling you on the Friday prior to your surgery. If you have not heard from anyone by 8pm, please call the hospital supervisor through the hospital  at [phone]. ([place] [phone] or [place] [phone]).    Do not eat or drink anything after midnight the night before your surgery, including candy, mints, lifesavers, or chewing gum. Do not drink alcohol 24hrs before your surgery. Try not to smoke at least 24hrs before your surgery.       Follow the pre surgery showering instructions as listed in the “My Surgical Experience Booklet” or otherwise provided by your surgeon's office. Do not use a blade to shave the surgical area 1 week before surgery. It is okay to use a clean electric clippers up to 24 hours before surgery. Do not apply any lotions, creams, including makeup, cologne, deodorant, or perfumes after showering on the day of your surgery. Do not use  dry shampoo, hair spray, hair gel, or any type of hair products.     No contact lenses, eye make-up, or artificial eyelashes. Remove nail polish, including gel polish, and any artificial, gel, or acrylic nails if possible. Remove all jewelry including rings and body piercing jewelry.     Wear causal clothing that is easy to take on and off. Consider your type of surgery.    Keep any valuables, jewelry, piercings at home. Please bring any specially ordered equipment (sling, braces) if indicated.    Arrange for a responsible person to drive you to and from the hospital on the day of your surgery. Please confirm the visitor policy for the day of your procedure when you receive your phone call with an arrival time.     Call the surgeon's office with any new illnesses, exposures, or additional questions prior to surgery.    Please reference your “My Surgical Experience Booklet” for additional information to prepare for your upcoming surgery.     Confirmed patient received showering instructions and skin cleanser from surgeon's office.

## 2025-02-18 ENCOUNTER — ANESTHESIA EVENT (OUTPATIENT)
Age: 65
End: 2025-02-18
Payer: OTHER MISCELLANEOUS

## 2025-02-18 NOTE — TELEPHONE ENCOUNTER
"Patient contacted for a postoperative follow up assessment. Patient reports doing   \"great\" and \"pain and discomfort, but comfortable right now.\" Patient states \"everything looks good there\" in regards to dressing, which is dry with no drainage.  He is ambulating with the the RW. Patient denies increase in swelling, \"way less then I thought\" and we discussed icing, which he is doing, and discussed continuing to do so.    We reviewed patients AVS medication list. Patient is taking Tylenol 1000mg every 8 hours, Celebrex 200mg BID, Oxycodone 5mg every 4 hours, he is back on his Coumadin, and taking lovenox (two today, then two tomorrow- redraw on Thursday per patient), Colace 100mg BID. Patient has not yet had a BM but is passing gas, and we discussed adding miralax or metamucil if needed.     Patient denies nausea, vomiting, abdominal pain, chest pain, shortness of breath, fever, dizziness and calf pain. Patient does not have any other questions or concerns at this time. Pt was encouraged to call with any questions, concerns or issues.    "
unknown

## 2025-02-19 ENCOUNTER — TELEPHONE (OUTPATIENT)
Dept: OBGYN CLINIC | Facility: CLINIC | Age: 65
End: 2025-02-19

## 2025-02-20 RX ORDER — ENOXAPARIN SODIUM 150 MG/ML
INJECTION SUBCUTANEOUS
COMMUNITY
Start: 2025-03-05 | End: 2025-03-09

## 2025-02-21 ENCOUNTER — TELEPHONE (OUTPATIENT)
Age: 65
End: 2025-02-21

## 2025-02-21 ENCOUNTER — OFFICE VISIT (OUTPATIENT)
Age: 65
End: 2025-02-21
Payer: OTHER MISCELLANEOUS

## 2025-02-21 VITALS
HEIGHT: 63 IN | SYSTOLIC BLOOD PRESSURE: 134 MMHG | HEART RATE: 67 BPM | OXYGEN SATURATION: 98 % | DIASTOLIC BLOOD PRESSURE: 82 MMHG | BODY MASS INDEX: 37.2 KG/M2

## 2025-02-21 DIAGNOSIS — Z96.651 S/P TOTAL KNEE REPLACEMENT, RIGHT: ICD-10-CM

## 2025-02-21 DIAGNOSIS — M17.12 PRIMARY OSTEOARTHRITIS OF LEFT KNEE: ICD-10-CM

## 2025-02-21 DIAGNOSIS — E66.09 CLASS 2 OBESITY DUE TO EXCESS CALORIES WITHOUT SERIOUS COMORBIDITY WITH BODY MASS INDEX (BMI) OF 38.0 TO 38.9 IN ADULT: Chronic | ICD-10-CM

## 2025-02-21 DIAGNOSIS — I10 BENIGN ESSENTIAL HYPERTENSION: Chronic | ICD-10-CM

## 2025-02-21 DIAGNOSIS — E66.812 CLASS 2 OBESITY DUE TO EXCESS CALORIES WITHOUT SERIOUS COMORBIDITY WITH BODY MASS INDEX (BMI) OF 38.0 TO 38.9 IN ADULT: Chronic | ICD-10-CM

## 2025-02-21 DIAGNOSIS — Z01.818 PREOPERATIVE CLEARANCE: Primary | ICD-10-CM

## 2025-02-21 DIAGNOSIS — D68.51 FACTOR V LEIDEN (HCC): Chronic | ICD-10-CM

## 2025-02-21 DIAGNOSIS — Z86.718 H/O BLOOD CLOTS: ICD-10-CM

## 2025-02-21 PROCEDURE — 99214 OFFICE O/P EST MOD 30 MIN: CPT | Performed by: NURSE PRACTITIONER

## 2025-02-21 NOTE — TELEPHONE ENCOUNTER
Caller: Yudelka at [facility]    Doctor: Dr. Wood    Reason for call:     Yudelka is asking for medical information on medical records, clinical documentation on the surgery for total left knee surgery on 3/5/25.  I provided the fax number to her for the office.  Please assist Yudelka so she can approve the surgery.  Thanks    Call back#: Yudelka # [phone]

## 2025-02-25 ENCOUNTER — TELEPHONE (OUTPATIENT)
Dept: OBGYN CLINIC | Facility: CLINIC | Age: 65
End: 2025-02-25

## 2025-02-25 NOTE — TELEPHONE ENCOUNTER
Patient called to confirm surgery is billed under W/C.    Yes, I confirmed with Prior Auth team. [facility] and [facility] approved surgery. Need auth from both.  Patient understood.

## 2025-03-04 NOTE — ANESTHESIA PREPROCEDURE EVALUATION
Procedure:  ARTHROPLASTY KNEE TOTAL, LEFT- SAME DAY (Left: Knee)    Relevant Problems   ANESTHESIA   (+) PONV (postoperative nausea and vomiting)      CARDIO   (+) Benign essential hypertension   (+) Hypercholesterolemia   (+) Pulmonary embolism (HCC)      MUSCULOSKELETAL   (+) Primary osteoarthritis of left knee      Lab Results   Component Value Date    WBC 9.46 02/10/2025    HGB 14.4 02/10/2025    HCT 45.6 02/10/2025    MCV 89 02/10/2025     02/10/2025     Lab Results   Component Value Date    INR 2.44 (H) 02/10/2025    INR 2.21 (H) 07/17/2024    INR 1.82 (H) 07/16/2024    PROTIME 26.8 (H) 02/10/2025    PROTIME 25.0 (H) 07/17/2024    PROTIME 21.5 (H) 07/16/2024         Physical Exam    Airway    Mallampati score: II  TM Distance: >3 FB  Neck ROM: full     Dental   No notable dental hx     Cardiovascular  Rhythm: regular, Rate: normal    Pulmonary   Breath sounds clear to auscultation    Other Findings  Intercisor Distance > 3cm          Anesthesia Plan  ASA Score- 2     Anesthesia Type- general with ASA Monitors.     There is patient reason for not using neuraxial anesthesia or peripheral nerve block (Not off coumadine for 5 days. INR has not normalized)    Additional Monitors:     Airway Plan: ETT.    Comment: Discussed benefits/risks of general anesthesia including possibility of mouth/throat pain, injury to lips/teeth, nausea/vomiting, and surgical pain along with more rare complications such as stroke, MI, pneumonia, aspiration, and injury to blood vessels. All questions answered.    Discussed nerve block to assist with post-operative analgesia. Discussed possibility of uncommon complications including permanent nerve injury, damage to blood vessels, infection local anesthetic toxicity, and nerve block failure. Patient understands and wishes to proceed.       .       Plan Factors-Exercise tolerance (METS): >4 METS.    Chart reviewed. EKG reviewed.  Existing labs reviewed.                   Induction-  intravenous.    Postoperative Plan- Plan for postoperative opioid use. Planned trial extubation        Informed Consent- Anesthetic plan and risks discussed with patient.  I personally reviewed this patient with the CRNA. Discussed and agreed on the Anesthesia Plan with the CRNA..      NPO Status:  No vitals data found for the desired time range.

## 2025-03-05 ENCOUNTER — ANESTHESIA (OUTPATIENT)
Age: 65
End: 2025-03-05
Payer: OTHER MISCELLANEOUS

## 2025-03-05 ENCOUNTER — HOSPITAL ENCOUNTER (OUTPATIENT)
Age: 65
Setting detail: OUTPATIENT SURGERY
Discharge: HOME | End: 2025-03-05
Attending: STUDENT IN AN ORGANIZED HEALTH CARE EDUCATION/TRAINING PROGRAM | Admitting: STUDENT IN AN ORGANIZED HEALTH CARE EDUCATION/TRAINING PROGRAM
Payer: OTHER MISCELLANEOUS

## 2025-03-05 ENCOUNTER — APPOINTMENT (OUTPATIENT)
Age: 65
End: 2025-03-05
Payer: OTHER MISCELLANEOUS

## 2025-03-05 VITALS
WEIGHT: 215 LBS | SYSTOLIC BLOOD PRESSURE: 138 MMHG | TEMPERATURE: 98.4 F | HEART RATE: 78 BPM | BODY MASS INDEX: 38.09 KG/M2 | OXYGEN SATURATION: 91 % | DIASTOLIC BLOOD PRESSURE: 65 MMHG | RESPIRATION RATE: 25 BRPM | HEIGHT: 63 IN

## 2025-03-05 DIAGNOSIS — M17.12 PRIMARY OSTEOARTHRITIS OF LEFT KNEE: Primary | ICD-10-CM

## 2025-03-05 LAB
APTT PPP: 32 SECONDS (ref 23–34)
GLUCOSE SERPL-MCNC: 106 MG/DL (ref 65–140)
INR PPP: 1.4 (ref 0.85–1.19)
PROTHROMBIN TIME: 17 SECONDS (ref 12.3–15)

## 2025-03-05 PROCEDURE — 27447 TOTAL KNEE ARTHROPLASTY: CPT | Performed by: STUDENT IN AN ORGANIZED HEALTH CARE EDUCATION/TRAINING PROGRAM

## 2025-03-05 PROCEDURE — C1776 JOINT DEVICE (IMPLANTABLE): HCPCS | Performed by: STUDENT IN AN ORGANIZED HEALTH CARE EDUCATION/TRAINING PROGRAM

## 2025-03-05 PROCEDURE — 27447 TOTAL KNEE ARTHROPLASTY: CPT

## 2025-03-05 PROCEDURE — C1713 ANCHOR/SCREW BN/BN,TIS/BN: HCPCS | Performed by: STUDENT IN AN ORGANIZED HEALTH CARE EDUCATION/TRAINING PROGRAM

## 2025-03-05 PROCEDURE — 82948 REAGENT STRIP/BLOOD GLUCOSE: CPT

## 2025-03-05 PROCEDURE — 85730 THROMBOPLASTIN TIME PARTIAL: CPT | Performed by: ANESTHESIOLOGY

## 2025-03-05 PROCEDURE — 97163 PT EVAL HIGH COMPLEX 45 MIN: CPT | Performed by: PHYSICAL THERAPIST

## 2025-03-05 PROCEDURE — A6250 SKIN SEAL PROTECT MOISTURIZR: HCPCS | Performed by: STUDENT IN AN ORGANIZED HEALTH CARE EDUCATION/TRAINING PROGRAM

## 2025-03-05 PROCEDURE — 73560 X-RAY EXAM OF KNEE 1 OR 2: CPT

## 2025-03-05 PROCEDURE — 85610 PROTHROMBIN TIME: CPT | Performed by: ANESTHESIOLOGY

## 2025-03-05 DEVICE — 3DMETAL TIBIAL TRAY SIZE 3L
Type: IMPLANTABLE DEVICE | Site: KNEE | Status: FUNCTIONAL
Brand: GMK 3D METAL TIBIAL TRAY

## 2025-03-05 DEVICE — GMK SPHERIKA FEMUR POROUS TIGROWTHC+HA S3L
Type: IMPLANTABLE DEVICE | Site: KNEE | Status: FUNCTIONAL
Brand: GMK SPHERIKA CEMENTLESS

## 2025-03-05 DEVICE — TIBIAL INSERT FIXED SPHERE FLEX   #3/10 MM L E-CROSS
Type: IMPLANTABLE DEVICE | Site: KNEE | Status: FUNCTIONAL
Brand: GMK SPHERE TOTAL KNEE SYSTEM

## 2025-03-05 RX ORDER — PROMETHAZINE HYDROCHLORIDE 25 MG/ML
6.25 INJECTION, SOLUTION INTRAMUSCULAR; INTRAVENOUS ONCE AS NEEDED
Status: DISCONTINUED | OUTPATIENT
Start: 2025-03-05 | End: 2025-03-05 | Stop reason: HOSPADM

## 2025-03-05 RX ORDER — SODIUM CHLORIDE, SODIUM LACTATE, POTASSIUM CHLORIDE, CALCIUM CHLORIDE 600; 310; 30; 20 MG/100ML; MG/100ML; MG/100ML; MG/100ML
1.5 INJECTION, SOLUTION INTRAVENOUS CONTINUOUS
Status: DISCONTINUED | OUTPATIENT
Start: 2025-03-05 | End: 2025-03-05 | Stop reason: HOSPADM

## 2025-03-05 RX ORDER — ACETAMINOPHEN 325 MG/1
650 TABLET ORAL EVERY 6 HOURS PRN
Status: DISCONTINUED | OUTPATIENT
Start: 2025-03-05 | End: 2025-03-05 | Stop reason: SDUPTHER

## 2025-03-05 RX ORDER — LIDOCAINE HYDROCHLORIDE 10 MG/ML
INJECTION, SOLUTION EPIDURAL; INFILTRATION; INTRACAUDAL; PERINEURAL AS NEEDED
Status: DISCONTINUED | OUTPATIENT
Start: 2025-03-05 | End: 2025-03-05

## 2025-03-05 RX ORDER — PROPOFOL 10 MG/ML
INJECTION, EMULSION INTRAVENOUS CONTINUOUS PRN
Status: DISCONTINUED | OUTPATIENT
Start: 2025-03-05 | End: 2025-03-05

## 2025-03-05 RX ORDER — CEFAZOLIN SODIUM 2 G/50ML
2000 SOLUTION INTRAVENOUS EVERY 8 HOURS
Status: DISCONTINUED | OUTPATIENT
Start: 2025-03-05 | End: 2025-03-05 | Stop reason: HOSPADM

## 2025-03-05 RX ORDER — ACETAMINOPHEN 500 MG
1000 TABLET ORAL EVERY 8 HOURS PRN
Qty: 84 TABLET | Refills: 0 | Status: SHIPPED | OUTPATIENT
Start: 2025-03-05 | End: 2025-03-19

## 2025-03-05 RX ORDER — ACETAMINOPHEN 325 MG/1
650 TABLET ORAL EVERY 4 HOURS PRN
Status: DISCONTINUED | OUTPATIENT
Start: 2025-03-05 | End: 2025-03-05 | Stop reason: HOSPADM

## 2025-03-05 RX ORDER — FENTANYL CITRATE 50 UG/ML
INJECTION, SOLUTION INTRAMUSCULAR; INTRAVENOUS AS NEEDED
Status: DISCONTINUED | OUTPATIENT
Start: 2025-03-05 | End: 2025-03-05

## 2025-03-05 RX ORDER — ROCURONIUM BROMIDE 10 MG/ML
INJECTION, SOLUTION INTRAVENOUS AS NEEDED
Status: DISCONTINUED | OUTPATIENT
Start: 2025-03-05 | End: 2025-03-05

## 2025-03-05 RX ORDER — HYDROCHLOROTHIAZIDE 25 MG/1
25 TABLET ORAL DAILY
Status: DISCONTINUED | OUTPATIENT
Start: 2025-03-06 | End: 2025-03-05 | Stop reason: HOSPADM

## 2025-03-05 RX ORDER — TRANEXAMIC ACID 10 MG/ML
1000 INJECTION, SOLUTION INTRAVENOUS ONCE
Status: COMPLETED | OUTPATIENT
Start: 2025-03-05 | End: 2025-03-05

## 2025-03-05 RX ORDER — MAGNESIUM HYDROXIDE/ALUMINUM HYDROXICE/SIMETHICONE 120; 1200; 1200 MG/30ML; MG/30ML; MG/30ML
30 SUSPENSION ORAL EVERY 6 HOURS PRN
Status: DISCONTINUED | OUTPATIENT
Start: 2025-03-05 | End: 2025-03-05 | Stop reason: HOSPADM

## 2025-03-05 RX ORDER — CHLORHEXIDINE GLUCONATE ORAL RINSE 1.2 MG/ML
15 SOLUTION DENTAL ONCE
Status: COMPLETED | OUTPATIENT
Start: 2025-03-05 | End: 2025-03-05

## 2025-03-05 RX ORDER — ONDANSETRON 2 MG/ML
4 INJECTION INTRAMUSCULAR; INTRAVENOUS EVERY 6 HOURS PRN
Status: DISCONTINUED | OUTPATIENT
Start: 2025-03-05 | End: 2025-03-05 | Stop reason: HOSPADM

## 2025-03-05 RX ORDER — HYDROMORPHONE HCL/PF 1 MG/ML
1 SYRINGE (ML) INJECTION ONCE
Status: COMPLETED | OUTPATIENT
Start: 2025-03-05 | End: 2025-03-05

## 2025-03-05 RX ORDER — WARFARIN SODIUM 5 MG/1
5 TABLET ORAL
Status: DISCONTINUED | OUTPATIENT
Start: 2025-03-06 | End: 2025-03-05 | Stop reason: HOSPADM

## 2025-03-05 RX ORDER — ONDANSETRON 2 MG/ML
INJECTION INTRAMUSCULAR; INTRAVENOUS AS NEEDED
Status: DISCONTINUED | OUTPATIENT
Start: 2025-03-05 | End: 2025-03-05

## 2025-03-05 RX ORDER — OXYCODONE HYDROCHLORIDE 10 MG/1
10 TABLET ORAL EVERY 4 HOURS PRN
Status: DISCONTINUED | OUTPATIENT
Start: 2025-03-05 | End: 2025-03-05 | Stop reason: HOSPADM

## 2025-03-05 RX ORDER — DOCUSATE SODIUM 100 MG/1
100 CAPSULE, LIQUID FILLED ORAL 2 TIMES DAILY
Status: DISCONTINUED | OUTPATIENT
Start: 2025-03-05 | End: 2025-03-05 | Stop reason: HOSPADM

## 2025-03-05 RX ORDER — CHLORHEXIDINE GLUCONATE 40 MG/ML
SOLUTION TOPICAL DAILY PRN
Status: DISCONTINUED | OUTPATIENT
Start: 2025-03-05 | End: 2025-03-05 | Stop reason: HOSPADM

## 2025-03-05 RX ORDER — HYDROMORPHONE HCL/PF 1 MG/ML
SYRINGE (ML) INJECTION AS NEEDED
Status: DISCONTINUED | OUTPATIENT
Start: 2025-03-05 | End: 2025-03-05

## 2025-03-05 RX ORDER — MIDAZOLAM HYDROCHLORIDE 2 MG/2ML
INJECTION, SOLUTION INTRAMUSCULAR; INTRAVENOUS AS NEEDED
Status: DISCONTINUED | OUTPATIENT
Start: 2025-03-05 | End: 2025-03-05

## 2025-03-05 RX ORDER — BUPIVACAINE HYDROCHLORIDE 2.5 MG/ML
INJECTION, SOLUTION EPIDURAL; INFILTRATION; INTRACAUDAL
Status: COMPLETED | OUTPATIENT
Start: 2025-03-05 | End: 2025-03-05

## 2025-03-05 RX ORDER — ACETAMINOPHEN 325 MG/1
975 TABLET ORAL ONCE
Status: COMPLETED | OUTPATIENT
Start: 2025-03-05 | End: 2025-03-05

## 2025-03-05 RX ORDER — GABAPENTIN 300 MG/1
300 CAPSULE ORAL
Status: DISCONTINUED | OUTPATIENT
Start: 2025-03-05 | End: 2025-03-05 | Stop reason: HOSPADM

## 2025-03-05 RX ORDER — OXYCODONE HYDROCHLORIDE 5 MG/1
5 TABLET ORAL EVERY 4 HOURS PRN
Qty: 30 TABLET | Refills: 0 | Status: SHIPPED | OUTPATIENT
Start: 2025-03-05

## 2025-03-05 RX ORDER — DEXAMETHASONE SODIUM PHOSPHATE 10 MG/ML
INJECTION, SOLUTION INTRA-ARTICULAR; INTRALESIONAL; INTRAMUSCULAR; INTRAVENOUS; SOFT TISSUE AS NEEDED
Status: DISCONTINUED | OUTPATIENT
Start: 2025-03-05 | End: 2025-03-05

## 2025-03-05 RX ORDER — SODIUM CHLORIDE, SODIUM LACTATE, POTASSIUM CHLORIDE, CALCIUM CHLORIDE 600; 310; 30; 20 MG/100ML; MG/100ML; MG/100ML; MG/100ML
125 INJECTION, SOLUTION INTRAVENOUS CONTINUOUS
Status: DISCONTINUED | OUTPATIENT
Start: 2025-03-05 | End: 2025-03-05 | Stop reason: HOSPADM

## 2025-03-05 RX ORDER — HYDROMORPHONE HCL/PF 1 MG/ML
0.5 SYRINGE (ML) INJECTION
Status: DISCONTINUED | OUTPATIENT
Start: 2025-03-05 | End: 2025-03-05 | Stop reason: HOSPADM

## 2025-03-05 RX ORDER — KETAMINE HCL IN NACL, ISO-OSM 100MG/10ML
SYRINGE (ML) INJECTION AS NEEDED
Status: DISCONTINUED | OUTPATIENT
Start: 2025-03-05 | End: 2025-03-05

## 2025-03-05 RX ORDER — OXYCODONE HYDROCHLORIDE 5 MG/1
5 TABLET ORAL EVERY 4 HOURS PRN
Status: DISCONTINUED | OUTPATIENT
Start: 2025-03-05 | End: 2025-03-05 | Stop reason: HOSPADM

## 2025-03-05 RX ORDER — VANCOMYCIN HYDROCHLORIDE 1 G/20ML
INJECTION, POWDER, LYOPHILIZED, FOR SOLUTION INTRAVENOUS AS NEEDED
Status: DISCONTINUED | OUTPATIENT
Start: 2025-03-05 | End: 2025-03-05 | Stop reason: HOSPADM

## 2025-03-05 RX ORDER — MAGNESIUM HYDROXIDE 1200 MG/15ML
LIQUID ORAL AS NEEDED
Status: DISCONTINUED | OUTPATIENT
Start: 2025-03-05 | End: 2025-03-05 | Stop reason: HOSPADM

## 2025-03-05 RX ORDER — SENNOSIDES 8.6 MG
1 TABLET ORAL DAILY
Status: DISCONTINUED | OUTPATIENT
Start: 2025-03-05 | End: 2025-03-05 | Stop reason: HOSPADM

## 2025-03-05 RX ORDER — CALCIUM CARBONATE 500 MG/1
1000 TABLET, CHEWABLE ORAL DAILY PRN
Status: DISCONTINUED | OUTPATIENT
Start: 2025-03-05 | End: 2025-03-05 | Stop reason: HOSPADM

## 2025-03-05 RX ORDER — CEFAZOLIN SODIUM 2 G/50ML
2000 SOLUTION INTRAVENOUS ONCE
Status: COMPLETED | OUTPATIENT
Start: 2025-03-05 | End: 2025-03-05

## 2025-03-05 RX ORDER — HYDROMORPHONE HCL/PF 1 MG/ML
0.5 SYRINGE (ML) INJECTION EVERY 2 HOUR PRN
Status: DISCONTINUED | OUTPATIENT
Start: 2025-03-05 | End: 2025-03-05 | Stop reason: HOSPADM

## 2025-03-05 RX ORDER — CEFADROXIL 500 MG/1
500 CAPSULE ORAL EVERY 12 HOURS SCHEDULED
Qty: 14 CAPSULE | Refills: 0 | Status: SHIPPED | OUTPATIENT
Start: 2025-03-05 | End: 2025-03-12

## 2025-03-05 RX ORDER — SCOPOLAMINE 1 MG/3D
1 PATCH, EXTENDED RELEASE TRANSDERMAL
Status: DISCONTINUED | OUTPATIENT
Start: 2025-03-05 | End: 2025-03-05 | Stop reason: HOSPADM

## 2025-03-05 RX ADMIN — FENTANYL CITRATE 100 MCG: 50 INJECTION INTRAMUSCULAR; INTRAVENOUS at 14:02

## 2025-03-05 RX ADMIN — PROPOFOL 160 MG: 10 INJECTION, EMULSION INTRAVENOUS at 14:02

## 2025-03-05 RX ADMIN — SODIUM CHLORIDE, SODIUM LACTATE, POTASSIUM CHLORIDE, AND CALCIUM CHLORIDE: .6; .31; .03; .02 INJECTION, SOLUTION INTRAVENOUS at 13:58

## 2025-03-05 RX ADMIN — CHLORHEXIDINE GLUCONATE 15 ML: 1.2 SOLUTION ORAL at 09:55

## 2025-03-05 RX ADMIN — PHENYLEPHRINE HYDROCHLORIDE 100 MCG: 10 INJECTION INTRAVENOUS at 15:14

## 2025-03-05 RX ADMIN — Medication 20 MG: at 14:02

## 2025-03-05 RX ADMIN — ONDANSETRON 4 MG: 2 INJECTION INTRAMUSCULAR; INTRAVENOUS at 15:09

## 2025-03-05 RX ADMIN — PHENYLEPHRINE HYDROCHLORIDE 100 MCG: 10 INJECTION INTRAVENOUS at 15:06

## 2025-03-05 RX ADMIN — Medication 10 MG: at 14:23

## 2025-03-05 RX ADMIN — SCOPOLAMINE 1 PATCH: 1.5 PATCH, EXTENDED RELEASE TRANSDERMAL at 16:00

## 2025-03-05 RX ADMIN — OXYCODONE HYDROCHLORIDE 5 MG: 5 TABLET ORAL at 17:05

## 2025-03-05 RX ADMIN — ACETAMINOPHEN 975 MG: 325 TABLET ORAL at 09:54

## 2025-03-05 RX ADMIN — TRANEXAMIC ACID 1000 MG: 10 INJECTION, SOLUTION INTRAVENOUS at 14:07

## 2025-03-05 RX ADMIN — TRANEXAMIC ACID 1000 MG: 10 INJECTION, SOLUTION INTRAVENOUS at 15:02

## 2025-03-05 RX ADMIN — MIDAZOLAM 2 MG: 1 INJECTION INTRAMUSCULAR; INTRAVENOUS at 13:57

## 2025-03-05 RX ADMIN — HYDROMORPHONE HYDROCHLORIDE 0.5 MG: 1 INJECTION, SOLUTION INTRAMUSCULAR; INTRAVENOUS; SUBCUTANEOUS at 14:23

## 2025-03-05 RX ADMIN — MIDAZOLAM 2 MG: 1 INJECTION INTRAMUSCULAR; INTRAVENOUS at 10:19

## 2025-03-05 RX ADMIN — DEXAMETHASONE SODIUM PHOSPHATE 10 MG: 10 INJECTION, SOLUTION INTRA-ARTICULAR; INTRALESIONAL; INTRAMUSCULAR; INTRAVENOUS; SOFT TISSUE at 14:02

## 2025-03-05 RX ADMIN — HYDROMORPHONE HYDROCHLORIDE 0.5 MG: 1 INJECTION, SOLUTION INTRAMUSCULAR; INTRAVENOUS; SUBCUTANEOUS at 15:45

## 2025-03-05 RX ADMIN — SUGAMMADEX 200 MG: 100 INJECTION, SOLUTION INTRAVENOUS at 15:22

## 2025-03-05 RX ADMIN — ROCURONIUM BROMIDE 50 MG: 10 INJECTION, SOLUTION INTRAVENOUS at 14:03

## 2025-03-05 RX ADMIN — PROPOFOL 80 MCG/KG/MIN: 10 INJECTION, EMULSION INTRAVENOUS at 14:13

## 2025-03-05 RX ADMIN — HYDROMORPHONE HYDROCHLORIDE 0.5 MG: 1 INJECTION, SOLUTION INTRAMUSCULAR; INTRAVENOUS; SUBCUTANEOUS at 16:00

## 2025-03-05 RX ADMIN — BUPIVACAINE HYDROCHLORIDE 20 ML: 2.5 INJECTION, SOLUTION EPIDURAL; INFILTRATION; INTRACAUDAL; PERINEURAL at 10:20

## 2025-03-05 RX ADMIN — LIDOCAINE HYDROCHLORIDE 50 MG: 10 INJECTION, SOLUTION EPIDURAL; INFILTRATION; INTRACAUDAL; PERINEURAL at 14:02

## 2025-03-05 RX ADMIN — SODIUM CHLORIDE, SODIUM LACTATE, POTASSIUM CHLORIDE, AND CALCIUM CHLORIDE: .6; .31; .03; .02 INJECTION, SOLUTION INTRAVENOUS at 15:03

## 2025-03-05 RX ADMIN — HYDROMORPHONE HYDROCHLORIDE 1 MG: 1 INJECTION, SOLUTION INTRAMUSCULAR; INTRAVENOUS; SUBCUTANEOUS at 16:27

## 2025-03-05 RX ADMIN — CEFAZOLIN SODIUM 2000 MG: 2 SOLUTION INTRAVENOUS at 14:02

## 2025-03-05 RX ADMIN — BUPIVACAINE 20 ML: 13.3 INJECTION, SUSPENSION, LIPOSOMAL INFILTRATION at 10:20

## 2025-03-05 RX ADMIN — ROCURONIUM BROMIDE 20 MG: 10 INJECTION, SOLUTION INTRAVENOUS at 14:38

## 2025-03-05 NOTE — DISCHARGE INSTR - AVS FIRST PAGE
Rick Wood MD  Adult Reconstruction Specialist   [facility]  Office Phone: [phone]    Discharge Instructions - Knee Replacement    What to Expect/Activity  You are weight bearing as tolerated to your operative leg with assistive devices.  Please use crutches/walker when ambulating as needed until your follow-up  Significant swelling and discomfort in the knee is normal for several days to weeks after surgery. You may use ice around the knee to help as desired. This can be used for 20-30 minutes every 1-2 hours  To optimally control swelling, your leg should be elevated above heart level as often as necessary. This can greatly improve post operative pain levels, due to uncontrolled swelling.   For the first several weeks after surgery, it is normal to experience redness, swelling, brusing and pain to the operative knee. This is generally not a sign of concern or an abnormal finding.    Post-operative Objectives  In effort to restore your knee range of motion, it is essential to immediately begin flexing your knee. Do so several times a day with the goal of achieving at or around 90 degrees by the time we see you 2 weeks post operatively.   There are no restrictions as to how often you can perform this knee motion. Your knee implant and incision are able to withstand these types of movement  You may utilize the physical therapy exercise packet provided to you by the physical therapy team. This should have been given to you during their initial assessment in the hospital.  If we feel it is appropriate, we will initiate formal physical therapy 2 weeks after surgery. Once we see you at the first post operative visit, we will decide if this is the best course of action and of benefit to you.   If you have any questions regarding the above objectives, please do not hesistate to call our office for clarification.        Dressing/Wound Care/Bathing  After surgery, your knee will be  wrapped in an ace wrap, toe to mid thigh, with a gray waterproof adhesive dressing underneath protecting the incision  You may remove your ace wrap 5 days after surgery and may be re-applied and/or tightened/loosened as necessary. I recommend to re-wrap your leg if swelling is an issue.   You may start showering 5 days after surgery. When drying off, please pat the dressing dry. If you notice the dressing appears saturated or is starting to come off, please over-wrap with dry dressing.  If you shower too early, there is a higher chance of infection and wound healing complications.  No baths, swimming or submerging until cleared by Dr. Wood    Pain Management/Medications  You may resume your usual medications.  Please take the following medications:  Anti-coagulation (blood clot prevention) - Continue home coumadin  Antibiotics - duricef x 7 days  Pain medication:  Narcotic Medication: Oxycodone 5 mg, 1 tablet every 4-6 hours as needed for severe pain  Tylenol 1000mg up to three times daily as needed for mild to moderate pain. Do not exceed 3000mg daily   If you have questions or pain concerns, please contact the office. Pain medication cannot remove all post-operative pain    Follow up/Call if:  The findings of your surgery will be explained to you and your family immediately after surgery. However, in the post-operative period, during recovery from anesthesia you may not fully remember or fully understand what was said. This will be again gone over when you return for your post-op appointment.  Please contact Dr. Wood's office if you experience the following:  Excessive bleeding (bleeding through your dressing)  Fever greater than 101 degrees F after 48 hours (low grade fevers the day or two after surgery are normal)  Persistent nausea or vomiting  Decreased sensation or discoloration of the operative limb  Pain or swelling that is getting worse and not better with medication      Dr. Wood's  Office Contact: [phone]

## 2025-03-05 NOTE — OP NOTE
OPERATIVE REPORT  PATIENT NAME: Tru Sy Jr.  : 1960  MRN: [id number]  Pt Location:  [facility] OR ROOM 03    Surgery Date: 3/5/2025    Surgeons and Role:     * Rick Wood MD - Primary     * Ralf Hilliard PA-C - Assisting     Preop Diagnosis:  Primary osteoarthritis of left knee [M17.12]    Post-Op Diagnosis Codes:     * Primary osteoarthritis of left knee [M17.12]    Procedure(s):  Left - ARTHROPLASTY KNEE TOTAL. LEFT- SAME DAY    Specimens:  * No specimens in log *    Estimated Blood Loss:   Minimal    Drains:  * No LDAs found *    Anesthesia Type:   Spinal     Operative Indications:  Primary osteoarthritis of left knee [M17.12]  See clinic note for complete list of indications    Operative Findings:  Full-thickness chondral wear to the medial and lateral compartments  Well-preserved patellofemoral compartment    Complications:   None    Knee Approach: Sub-vastus    Chronic Narcotic Use:  No      Procedure and Technique:  IMPLANTS:    1.  Medacta SpheriKA cementless size 3 femur  2.  Medacta SpheriKA cementless size 3 tibial baseplate.   3.  Medacta SpheriKA tibial liner thickness 10mm.         DESCRIPTION OF OPERATION:   After adequate anesthesia was induced, the patient was placed on the operating table and all bony prominences were padded.  An upper thigh tourniquet was placed and the left lower extremity was prepped and draped in the usual sterile fashion. A time out was performed verifying the patient, procedure, laterality and implants.  All were in agreement and the procedure began.    The limb was elevated for exsanguinated and the tourniquet was inflated to 250 mmHg. Our planned incision was made with a 10 blade scalpel. Dissection was carried down sharply through the skin and subcutaneous fat to the level of the extensor mechanism.  Full-thickness medial and lateral flaps were raised with Metzenbaum mena.  The fascial compartment of the vastus medialis was then entered with  electrocautery and the muscle was bluntly dissected off the intermuscular septum.  A retractors placed underneath the vastus medialis, the knee was brought to mid flexion and a subvastus arthrotomy was performed with a clean #10 scalpel.  Electrocautery was then used to perform a medial proximal tibial capsular peel, excision of the suprapatellar fat pad, and excision of the retropatellar fat pad.  The knee was brought to flexion allowing for excision of the ACL and anterior horn of the lateral meniscus.    The patella was then everted and inspected, peripheral osteophytes were removed with a rongeur and peripatellar neurolysis was performed. The decision was made to maintain the native patella     Our attention then turned to the distal femur.  The cartilage was inspected and was found to have full thickness chondral lesions on the Medial and Lateral condyles. Any remaining cartilage on the damaged condyle(s) was removed with a ring curette. A drill hole was placed in the distal femur and the fluted intramedullary guide chet was placed down the canal.  A distal femoral cutting guide was affixed to the femur and an oscillating saw was used to perform the distal femoral osteotomy.  The distal femoral resections were measured with calipers and recuts were made as needed. The posterior femoral condyles were then inspected for chondral lesions. Again, remaining cartilage on damaged condyle(s) was removed. The femoral sizing guide was then used and the appropriate component size was selected.  The 4-in-1 cutting block was affixed to the distal femur ensuring not to notch the anterior cortex.  Posterior condyles were cut and the bony fragments were again measured with calipers.  Recuts were made as needed.  An oscillating saw was then used to complete the remaining cuts.    Our attention then turned to the proximal tibia. An extramedullary cutting guide was pinned in place with the appropriate slope, coronal angulation and  depth of cut.  Being sure to protect soft tissues, the proximal tibia was resected with an oscillating saw. The tibial resection was removed, the proximal tibia was sized and the knee was brought into extension where the menisci were removed with electrocautery. The knee was then flexed and posterior femoral osteophytes were removed with an osteotome and curette. A sizing block was inserted in both flexion and extension. The tibia was re-cut if necessary to achieve excellent balance in both planes.     A tibial trial was pinned in place followed by the trial femur and liner. The knee was taken through range of motion and found to have excellent stability throughout the arc of motion.  Trials were removed, pericapsular tissues were injected with local anesthetic and the knee was thoroughly irrigated with sterile saline.    Final cementless tibial and femoral components were impacted in place along with the final polyethylene liner.  The tourniquet was deflated and hemostasis was achieved with electrocautery.  Local anesthesia was infiltrated to the pericapsular tissues.  Dilute betadine solution, normal saline, and Irricept solution were used sequentially to irrigate the soft tissues prior to closure.     At this point, our attention turned to closure. 1g Vancomycin powder was placed into the wound. The knee joint and extensor mechanism was closed using interrupted #1 vicryl suture and running #1 barbed suture. 2-0 vicryl interrupted sutures were used to close subcutaneous tissue. 3-0 monocryl suture was then used to close the skin edges. A glue and mesh dressing was then placed over the incision. A sterile compressive dressing was applied, the patient was awakened, and sent to the recovery room in stable condition. There were no complications. The sponge and needle counts were given as correct x 2.     No qualified resident was available to participate in this case. Braden Hilliard PA-c was necessary to assist with  positioning the patient, prepping and draping, and assisting with wound closure. I was present and performed all the key portions of the procedure.       SIGNATURE: Rick Wood MD  DATE: March 5, 2025  TIME: 5:22 PM

## 2025-03-05 NOTE — ANESTHESIA POSTPROCEDURE EVALUATION
Post-Op Assessment Note    CV Status:  Stable    Pain management: adequate       Mental Status:  Awake   Hydration Status:  Euvolemic   PONV Controlled:  Controlled   Airway Patency:  Patent     Post Op Vitals Reviewed: Yes    No anethesia notable event occurred.    Staff: Anesthesiologist           Last Filed PACU Vitals:  Vitals Value Taken Time   Temp 98.4 °F (36.9 °C) 03/05/25 1630   Pulse 61 03/05/25 1641   /74 03/05/25 1630   Resp 9 03/05/25 1641   SpO2 92 % 03/05/25 1641       Modified Yaron:     Vitals Value Taken Time   Activity 2 03/05/25 1641   Respiration 2 03/05/25 1641   Circulation 2 03/05/25 1641   Consciousness 2 03/05/25 1641   Oxygen Saturation 2 03/05/25 1641     Modified Yaron Score: 10

## 2025-03-05 NOTE — INTERVAL H&P NOTE
H&P reviewed. After examining the patient I find no changes in the patients condition since the H&P had been written.    Vitals:    03/05/25 0940   BP: 160/69   Pulse: 67   Resp: 18   Temp: 97.8 °F (36.6 °C)   SpO2: 97%

## 2025-03-05 NOTE — ANESTHESIA POSTPROCEDURE EVALUATION
Post-Op Assessment Note    CV Status:  Stable    Pain management: adequate       Mental Status:  Awake   Hydration Status:  Euvolemic   PONV Controlled:  Controlled   Airway Patency:  Patent  Two or more mitigation strategies used for obstructive sleep apnea   Post Op Vitals Reviewed: Yes    No anethesia notable event occurred.    Staff: CRNA, Anesthesiologist           Last Filed PACU Vitals:  Vitals Value Taken Time   Temp 98.6    Pulse 58    /57    Resp 12    SpO2 94%

## 2025-03-05 NOTE — PHYSICAL THERAPY NOTE
PT EVALUATION    Pt. Name: Tru Sy   Pt. Age: 64 y.o.  MRN: [id number]  LENGTH OF STAY: 0    Patient Active Problem List   Diagnosis    Primary osteoarthritis of left knee    Benign essential hypertension    Statin intolerance    Pulmonary embolism (HCC)    Prediabetes    Obesity    Hypercholesterolemia    Factor V Leiden (HCC)    Chronic anticoagulation    H/O blood clots    PONV (postoperative nausea and vomiting)    S/P total knee replacement, right    Left leg cellulitis       Admitting Diagnoses:   Primary osteoarthritis of left knee [M17.12]    Past Medical History:   Diagnosis Date    Arthritis     Factor V Leiden (HCC)     H/O blood clots     pulmonary embolism    Hypertension     PONV (postoperative nausea and vomiting)     Seasonal allergies        Past Surgical History:   Procedure Laterality Date    FOOT SURGERY Right     KNEE SURGERY Right 2009    [place] ARTHRP KNE CONDYLE&PLATU MEDIAL&LAT COMPARTMENTS Right 1/15/2024    Procedure: ARTHROPLASTY KNEE TOTAL, RIGHT SAMEDAY DISCHARGE;  Surgeon: Rick Wood MD;  Location: [facility];  Service: Orthopedics    SHOULDER SURGERY Bilateral        Imaging Studies:  XR knee left 1 or 2 views    (Results Pending)       03/05/25 1883   PT Last Visit   PT Visit Date 03/05/25   Note Type   Note type Evaluation   Additional Comments greeted in recliner chair   Pain Assessment   Pain Assessment Tool 0-10   Pain Score 3   Pain Location/Orientation Orientation: Left;Location: Knee   Hospital Pain Intervention(s) Repositioned;Ambulation/increased activity;Elevated;Emotional support;Rest   Restrictions/Precautions   Weight Bearing Precautions Per Order Yes   LLE Weight Bearing Per Order WBAT   Other Precautions Fall Risk;Pain;WBS   Home Living   Type of Home Apartment  (1 level)   Home Layout One level;Performs ADLs on one level;Able to live on main level with bedroom/bathroom;Stairs to enter with rails  (4 FAYE with BL rail, but only uses R rail)    Bathroom Shower/Tub Tub/shower unit   Bathroom Toilet Standard   Bathroom Equipment Grab bars in shower;Commode   Bathroom Accessibility Accessible   Home Equipment Walker;Cane   Prior Function   Level of [place] Independent with ADLs;Independent with functional mobility;Independent with IADLS   Lives With Alone   Receives Help From Family;Friend(s)   IADLs Independent with driving;Independent with meal prep;Independent with medication management   Falls in the last 6 months 0   Vocational Full time employment  (tradesman)   General   Family/Caregiver Present No   Cognition   Overall Cognitive Status WFL   Arousal/Participation Alert   Orientation Level Oriented X4   Following Commands Follows all commands and directions without difficulty   RUE Assessment   RUE Assessment WFL   LUE Assessment   LUE Assessment WFL   RLE Assessment   RLE Assessment WFL   LLE Assessment   LLE Assessment X  (did not assess knee due to post op, able to flex hip and move ankle through normal ROM)   Light Touch   RLE Light Touch Grossly intact   LLE Light Touch Grossly intact   Bed Mobility   Supine to Sit Unable to assess   Sit to Supine Unable to assess   Additional Comments greeted in recliner   Transfers   Sit to Stand 5  Supervision   Additional items Increased time required;Verbal cues  (RW)   Stand to Sit 5  Supervision   Additional items Increased time required;Verbal cues  (RW)   Additional Comments cues for hand placement   Ambulation/Elevation   Gait pattern Improper Weight shift;Antalgic;Decreased L stance;Excessively slow;Decreased toe off;Decreased heel strike   Gait Assistance 5  Supervision   Additional items Verbal cues   Assistive Device Rolling walker   Distance 110'x1, 80'x1   Stair Management Assistance 5  Supervision   Additional items Verbal cues;Increased time required   Stair Management Technique Two rails;Step to pattern;Foreward   Number of Stairs 8   Ambulation/Elevation Additional Comments cues for LE  sequencing during stairs   Balance   Static Sitting Good   Dynamic Sitting Fair +   Static Standing Fair   Dynamic Standing Fair -   Ambulatory Fair -   Endurance Deficit   Endurance Deficit No   Activity Tolerance   Activity Tolerance Patient tolerated treatment well   Medical Staff Made Aware RN Goldie   Nurse Made Aware yes   Assessment   Prognosis Good   Problem List Decreased strength;Decreased range of motion;Decreased endurance;Impaired balance;Decreased mobility;Orthopedic restrictions;Pain   Assessment Pt is a 64 y.o. male who presented to [facility] on 3/5/2025 s/p L TKA done by Dr. Wood. Precautions include WBAT. Pt  has a past medical history of Arthritis, Factor V Leiden (HCC), H/O blood clots, Hypertension, PONV (postoperative nausea and vomiting), and Seasonal allergies.. Pt greeted in recliner chair for PT evaluation on 03/05/25. Pt referred to PT for functional mobility evaluation & D/C planning w/ orders of activity beginning POD #0 and activity as tolerated. PTA, pt reports being I w/o AD and I w/ IADLs. Personal factors affecting pt at time of IE include: stairs to enter home. Please find objective findings from PT assessment regarding body systems outlined above with impairments and limitations including weakness, decreased ROM, impaired balance, decreased endurance, gait deviations, pain, decreased activity tolerance, decreased functional mobility tolerance, fall risk, and orthopedic restrictions.  Please see flow sheet above for objective findings and level of assistance required for safe completion of functional tasks. Pt able to perform sit<>stand with RW and S. Pt able to ambulate 80'x1, 110'x1 with RW and S. Pt then performed 8 steps with BL rails and S. Pt noted feeling like room was spinning, BP taken and stable, improved symptoms following a few minutes of seated rest. Pt demonstrated dec endurance and tolerance to activity. Denies reports of dizziness or SOB t/o session. Patient was left  in recliner chair  with call bell and all needs within reach. Pt was educated on fall precautions and reinforced w/ good understanding. Based on pt presentation and impaired function, pt would benefit from level III, (minimal resource intensity) at D/C. From PT/mobility standpoint, pt would benefit from OPPT to address deficits as defined above and maximize level of independence and return pt to PLOF. HEP given and reviewed with patient, no questions at this time. CM to follow. Nsg staff to continue to mobilized pt (OOB in chair for all meals & ambulate in room/unit) as tolerated to prevent further decline in function. Nsg notified.   Barriers to Discharge Inaccessible home environment;Decreased caregiver support  (lives alone, FAYE)   Goals   Patient Goals to go home   STG Expiration Date 03/12/25   Short Term Goal #1 1).  Pt will perform bed mobility with Jaquelin demonstrating appropriate technique 100% of the time in order to improve function.2)  Perform all transfers with Jaquelin demonstrating safe and appropriate technique 100% of the time in order to improve ability to negotiate safely in home environment.3) Amb with least restrictive AD > 200'x1 with Jauqelin in order to demonstrate ability to negotiate in home environment.4)  Improve overall strength and balance 1/2 grade in order to optimize ability to perform functional tasks and reduce fall risk.5) Increase activity tolerance to 45 minutes in order to improve endurance to functional tasks.6)  Negotiate stairs using most appropriate technique and Jaquelin in order to be able to negotiate safely in home environment. 7) PT for ongoing patient and family/caregiver education, DME needs and d/c planning in order to promote highest level of function in least restrictive environment.   Plan   Treatment/Interventions Functional transfer training;LE strengthening/ROM;Elevations;Therapeutic exercise;Endurance training;Bed mobility;Gait training;Spoke to nursing   PT Frequency  Twice a day  (prn)   Discharge Recommendation   Rehab Resource Intensity Level, PT III (Minimum Resource Intensity)   Equipment Recommended Walker  (pt owns)   Additional Comments The patient's AM-PAC Basic Mobility Inpatient Short Form Raw Score is 20. A Raw score of greater than 16 suggests the patient may benefit from discharge to home. Please also refer to the recommendation of the Physical Therapist for safe discharge planning.   AM-PAC Basic Mobility Inpatient   Turning in Flat Bed Without Bedrails 4   Lying on Back to Sitting on Edge of Flat Bed Without Bedrails 4   Moving Bed to Chair 3   Standing Up From Chair Using Arms 3   Walk in Room 3   Climb 3-5 Stairs With Railing 3   Basic Mobility Inpatient Raw Score 20   Basic Mobility Standardized Score 43.99   [facility] Highest Level Of Mobility   -HLM Goal 6: Walk 10 steps or more   -HLM Achieved 7: Walk 25 feet or more   End of Consult   Patient Position at End of Consult Bedside chair;All needs within reach   End of Consult Comments pt stable, left in recliner chair at end of session with call bell. RN updated       Hx/personal factors: co-morbidities, inaccessible home, home alone, pain, WB restrictions, and fall risk  Examination: dec mobility, dec balance, dec endurance, dec amb, risk for falls, pain, assessed body system, balance, endurance, amb, D/C disposition & fall risk, WB restrictions, impairements in locomotion, musculoskeletal, balance, endurance, posture, coordination  Clinical: unpredictable (ongoing medical status, risk for falls, POD #0, and pain mgt)  Complexity: high  Smiley Pruitt, PT

## 2025-03-05 NOTE — PLAN OF CARE
Problem: PHYSICAL THERAPY ADULT  Goal: Performs mobility at highest level of function for planned discharge setting.  See evaluation for individualized goals.  Description: Treatment/Interventions: Functional transfer training, LE strengthening/ROM, Elevations, Therapeutic exercise, Endurance training, Bed mobility, Gait training, Spoke to nursing  Equipment Recommended: Walker (pt owns)       See flowsheet documentation for full assessment, interventions and recommendations.  Note: Prognosis: Good  Problem List: Decreased strength, Decreased range of motion, Decreased endurance, Impaired balance, Decreased mobility, Orthopedic restrictions, Pain  Assessment: Pt is a 64 y.o. male who presented to [facility] on 3/5/2025 s/p L TKA done by Dr. Wood. Precautions include WBAT. Pt  has a past medical history of Arthritis, Factor V Leiden (HCC), H/O blood clots, Hypertension, PONV (postoperative nausea and vomiting), and Seasonal allergies.. Pt greeted in recliner chair for PT evaluation on 03/05/25. Pt referred to PT for functional mobility evaluation & D/C planning w/ orders of activity beginning POD #0 and activity as tolerated. PTA, pt reports being I w/o AD and I w/ IADLs. Personal factors affecting pt at time of IE include: stairs to enter home. Please find objective findings from PT assessment regarding body systems outlined above with impairments and limitations including weakness, decreased ROM, impaired balance, decreased endurance, gait deviations, pain, decreased activity tolerance, decreased functional mobility tolerance, fall risk, and orthopedic restrictions.  Please see flow sheet above for objective findings and level of assistance required for safe completion of functional tasks. Pt able to perform sit<>stand with RW and S. Pt able to ambulate 80'x1, 110'x1 with RW and S. Pt then performed 8 steps with BL rails and S. Pt noted feeling like room was spinning, BP taken and stable, improved symptoms  following a few minutes of seated rest. Pt demonstrated dec endurance and tolerance to activity. Denies reports of dizziness or SOB t/o session. Patient was left in recliner chair  with call bell and all needs within reach. Pt was educated on fall precautions and reinforced w/ good understanding. Based on pt presentation and impaired function, pt would benefit from level III, (minimal resource intensity) at D/C. From PT/mobility standpoint, pt would benefit from OPPT to address deficits as defined above and maximize level of independence and return pt to PLOF. HEP given and reviewed with patient, no questions at this time. CM to follow. Nsg staff to continue to mobilized pt (OOB in chair for all meals & ambulate in room/unit) as tolerated to prevent further decline in function. Nsg notified.  Barriers to Discharge: Inaccessible home environment, Decreased caregiver support (lives alone, FAYE)     Rehab Resource Intensity Level, PT: III (Minimum Resource Intensity)    See flowsheet documentation for full assessment.

## 2025-03-05 NOTE — ANESTHESIA PROCEDURE NOTES
Peripheral Block    Patient location during procedure: holding area  Start time: 3/5/2025 10:20 AM  Reason for block: at surgeon's request and post-op pain management  Staffing  Performed by: Deyvi Bates MD  Authorized by: Deyvi Bates MD    Preanesthetic Checklist  Completed: patient identified, IV checked, site marked, risks and benefits discussed, surgical consent, monitors and equipment checked, pre-op evaluation and timeout performed  Peripheral Block  Patient position: supine  Prep: ChloraPrep  Patient monitoring: continuous pulse ox and frequent blood pressure checks  Block type: adductor canal block  Laterality: left  Injection technique: single-shot  Procedures: ultrasound guided, Ultrasound guidance required for the procedure to increase accuracy and safety of medication placement and decrease risk of complications.  Ultrasound permanent image saved  bupivacaine (PF) (MARCAINE) 0.25 % injection 20 mL - Perineural   20 mL - 3/5/2025 10:20:00 AM  Needle  Needle type: Stimuplex   Needle gauge: 20 G  Needle length: 4 in  Needle localization: ultrasound guidance  Assessment  Injection assessment: incremental injection and local visualized surrounding nerve on ultrasound  Paresthesia pain: none  patient tolerated the procedure well with no immediate complications  Additional Notes  Uncomplicated adductor canal block (saphenous nerve, nerve to vastus medialis)  Superficial femoral artery identified  Dose: 20ml exparel + 10ml 0.25% bupivacaine    Supplemented with 10ml 0.25% bupivacaine for anterior femoral cutaneous nerve

## 2025-03-06 ENCOUNTER — TELEPHONE (OUTPATIENT)
Dept: OBGYN CLINIC | Facility: HOSPITAL | Age: 65
End: 2025-03-06

## 2025-03-07 NOTE — TELEPHONE ENCOUNTER
Patient contacted for a postoperative follow up assessment. Patient states current pain level of a 5/10 when sitting and 6/10 when walking with RW.  Patient denies increase in swelling and dressing is Dressing C/D/I Patient isicing the site regularly. NN educated patient on post-op bruising, swelling, and icing.     We reviewed patients AVS medication list. Patient is taking Tylenol 1000mg every 8 hours, Oxycodone 5mg every 4 hours, Colace 100mg BID, and Warfarin 5mg daily and Lovenox injections. Patient has had a BM but is passing gas.       Patient denies nausea, vomiting, abdominal pain, chest pain, shortness of breath, fever, and calf pain Patient reports dizziness. Advised patient to switch positions slowly. Patient confirmed post-op appointment with surgeon on  3/20 at 10:15AM .Patient does not have any other questions or concerns at this time. Pt was encouraged to call with any questions, concerns or issues.  .

## 2025-03-10 ENCOUNTER — TELEPHONE (OUTPATIENT)
Age: 65
End: 2025-03-10

## 2025-03-10 DIAGNOSIS — M17.12 PRIMARY OSTEOARTHRITIS OF LEFT KNEE: ICD-10-CM

## 2025-03-10 NOTE — TELEPHONE ENCOUNTER
PA for OXY 5 MG  APPROVED     Date(s) approved UNTIL 03/08/2026        Patient advised by          []MyChart Message  []Phone call   [x]LMOM  []L/M to call office as no active Communication consent on file  []Unable to leave detailed message as VM not approved on Communication consent       Pharmacy advised by    [x]Fax  []Phone call  []Secure Chat       Approval letter scanned into Media Yes

## 2025-03-10 NOTE — TELEPHONE ENCOUNTER
PA for OXY 5 MG SUBMITTED to [employer]    via    [x]CMM-KEY: [id number]      [x]PA sent as URGENT    All office notes, labs and other pertaining documents and studies sent. Clinical questions answered. Awaiting determination from insurance company.     Turnaround time for your insurance to make a decision on your Prior Authorization can take 7-21 business days.

## 2025-03-10 NOTE — TELEPHONE ENCOUNTER
Reason for call:   [x] Refill   [] Prior Auth  [] Other:     Office:   [] PCP/Provider -   [x] Specialty/Provider - ortho / nichole    Medication: oxyCODONE (Roxicodone) 5 immediate release tablet     Dose/Frequency: Sig: Take 1 tablet (5 mg total) by mouth every 4 (four) hours as needed for severe pain for up to 30 doses Max Daily Amount: 30 mg      Quantity: 30    Pharmacy: [facility]   Does the patient have enough for 3 days?   [] Yes   [x] No - Send as HP to POD    Mail Away Pharmacy   Does the patient have enough for 10 days?   [] Yes   [] No - Send as HP to POD

## 2025-03-20 ENCOUNTER — OFFICE VISIT (OUTPATIENT)
Dept: OBGYN CLINIC | Facility: CLINIC | Age: 65
End: 2025-03-20

## 2025-03-20 VITALS — WEIGHT: 215 LBS | BODY MASS INDEX: 38.09 KG/M2 | HEIGHT: 63 IN

## 2025-03-20 DIAGNOSIS — Z96.652 S/P TOTAL KNEE ARTHROPLASTY, LEFT: Primary | ICD-10-CM

## 2025-03-20 PROCEDURE — 99024 POSTOP FOLLOW-UP VISIT: CPT

## 2025-03-20 RX ORDER — OXYCODONE HYDROCHLORIDE 5 MG/1
5 TABLET ORAL EVERY 4 HOURS PRN
Qty: 7 TABLET | Refills: 0 | Status: SHIPPED | OUTPATIENT
Start: 2025-03-20

## 2025-03-20 RX ORDER — OXYCODONE HYDROCHLORIDE 5 MG/1
5 TABLET ORAL EVERY 4 HOURS PRN
Qty: 30 TABLET | Refills: 0 | OUTPATIENT
Start: 2025-03-20

## 2025-03-20 NOTE — LETTER
March 21, 2025     Patient: Tru Sy Jr.  YOB: 1960  Date of Visit: 3/20/2025      To Whom it May Concern:    Tru Sy is under my professional care. Tru was seen in my office on 3/20/2025. Tru is to remain out of work at this time. We will update his work status at his next clinic visit in 4 weeks. Thank you.     If you have any questions or concerns, please don't hesitate to call.         Sincerely,          Ralf Hilliard PA-C

## 2025-03-20 NOTE — ASSESSMENT & PLAN NOTE
Patient is 2 weeks s/p left total knee arthroplasty  - WBAT LLE - wean off assistive devices as tolerated   - Tylenol and Celebrex for pain control prn  - Begin formal physical therapy for purposes of restoration of ROM and gait training  - Resume home coumadin for dvt ppx   - Discussed use of compression socks  - May shower, do not soak or submerge incision  - RTC in 4 weeks. left knee xrays needed

## 2025-03-20 NOTE — PROGRESS NOTES
Date: 25  Tru Sy Jr.   MRN# [id number]  : 1960      Chief Complaint: Post op left total knee arthroplasty    Assessment and Plan:    S/P total knee arthroplasty, left  Patient is 2 weeks s/p left total knee arthroplasty  - WBAT LLE - wean off assistive devices as tolerated   - Tylenol and Celebrex for pain control prn  - Begin formal physical therapy for purposes of restoration of ROM and gait training  - Resume home coumadin for dvt ppx   - Discussed use of compression socks  - May shower, do not soak or submerge incision  - RTC in 4 weeks. left knee xrays needed          Subjective:   Patient is 2 weeks s/p left total knee arthroplasty    Date of procedure: 3/5/25  Doing well, no new problems  Pain progressively improving  Improved swelling  Ambulating with no assistive device    Allergy:  Allergies   Allergen Reactions    Statins Myalgia    Other Myalgia     Medications:  all current active meds have been reviewed    Past Medical History:  Past Medical History:   Diagnosis Date    Arthritis     Factor V Leiden (HCC)     H/O blood clots     pulmonary embolism    Hypertension     PONV (postoperative nausea and vomiting)     Seasonal allergies      Past Surgical History:  Past Surgical History:   Procedure Laterality Date    FOOT SURGERY Right     KNEE SURGERY Right     [place] ARTHRP KNE CONDYLE&PLATU MEDIAL&LAT COMPARTMENTS Right 1/15/2024    Procedure: ARTHROPLASTY KNEE TOTAL, RIGHT SAMEDAY DISCHARGE;  Surgeon: Rick Wood MD;  Location: [facility];  Service: Orthopedics    [place] ARTHRP KNE CONDYLE&PLATU MEDIAL&LAT COMPARTMENTS Left 3/5/2025    Procedure: ARTHROPLASTY KNEE TOTAL, LEFT- SAME DAY;  Surgeon: Rick Wood MD;  Location:  MAIN OR;  Service: Orthopedics    SHOULDER SURGERY Bilateral      Family History:  Family History   Problem Relation Age of Onset    No Known Problems Mother     No Known Problems Father      Social History:  Social History     Substance and  "Sexual Activity   Alcohol Use Not Currently     Social History     Substance and Sexual Activity   Drug Use Never     Social History     Tobacco Use   Smoking Status Former    Current packs/day: 0.00    Average packs/day: 1 pack/day for 16.0 years (16.0 ttl pk-yrs)    Types: Cigarettes    Start date:     Quit date:     Years since quittin.2    Passive exposure: Never   Smokeless Tobacco Never           Review of Systems:  General- denies fever/chills  HEENT- denies hearing loss or sore throat  Eyes- denies eye pain or visual disturbances, denies red eyes  Respiratory- denies cough or SOB  Cardio- denies chest pain or palpitations  GI- denies abdominal pain  Endocrine- denies urinary frequency  Urinary- denies pain with urination  Musculoskeletal- Negative except noted above  Skin- denies rashes or wounds  Neurological- denies dizziness or headache  Psychiatric- denies anxiety or difficulty concentrating      Objective:   BP Readings from Last 1 Encounters:   25 138/65      Wt Readings from Last 1 Encounters:   25 97.5 kg (215 lb)      Pulse Readings from Last 1 Encounters:   25 78        BMI: Estimated body mass index is 38.09 kg/m² as calculated from the following:    Height as of this encounter: 5' 3\" (1.6 m).    Weight as of this encounter: 97.5 kg (215 lb).      Physical Exam  Ht 5' 3\" (1.6 m)   Wt 97.5 kg (215 lb)   BMI 38.09 kg/m²   General/Constitutional: No apparent distress: well-nourished and well developed.  Eyes: normal ocular motion  Cardio: RRR, Normal S1S2, No m/r/g.   Lymphatic: No appreciable lymphadenopathy  Respiratory: Non-labored breathing, CTA b/l no w/c/r  Vascular: No edema, swelling or tenderness, except as noted in detailed exam. Extremities well perfused. No LE edema  Integumentary: No impressive skin lesions present, except as noted in detailed exam.  Neuro: No ataxia or tremors noted  Psych: Normal mood and affect, oriented to person, place and time. " Appropriate affect.  Musculoskeletal: Normal, except as noted in detailed exam and in HPI.    Gait and Station:   normal and antalgic    left Knee Examination  Incision: clean, dry, and intact  Effusion: mild  Significant lower extremity swelling  Alignment: normal  AP Stability: stable  Coronal Stability  Extension:stable  Mid-flexion: stable  90 degrees flexion: stable  Range of motion  Active: 0 to 90    Extensor lag: Absent  Motor: 5/5 EHL/FHL/TA/GS/QD/HS  Neurovascular exam is intact.   No evidence of calf tightness or posterior cords      Images:  No new imaging

## 2025-03-21 ENCOUNTER — TELEPHONE (OUTPATIENT)
Age: 65
End: 2025-03-21

## 2025-03-21 NOTE — TELEPHONE ENCOUNTER
Caller: Dipti from     Doctor/Office: Dr Abbott    CB#: [phone]      What needs to be faxed: Dipti is requesting a work status note // Dipti did not speak to the patient          Fax#: [phone]

## 2025-04-18 ENCOUNTER — OFFICE VISIT (OUTPATIENT)
Dept: OBGYN CLINIC | Facility: CLINIC | Age: 65
End: 2025-04-18

## 2025-04-18 ENCOUNTER — APPOINTMENT (OUTPATIENT)
Dept: RADIOLOGY | Age: 65
End: 2025-04-18
Attending: STUDENT IN AN ORGANIZED HEALTH CARE EDUCATION/TRAINING PROGRAM
Payer: COMMERCIAL

## 2025-04-18 VITALS — HEIGHT: 63 IN | WEIGHT: 208 LBS | BODY MASS INDEX: 36.86 KG/M2

## 2025-04-18 DIAGNOSIS — Z96.652 S/P TOTAL KNEE ARTHROPLASTY, LEFT: Primary | ICD-10-CM

## 2025-04-18 DIAGNOSIS — Z96.652 S/P TOTAL KNEE ARTHROPLASTY, LEFT: ICD-10-CM

## 2025-04-18 DIAGNOSIS — Z96.651 S/P TOTAL KNEE REPLACEMENT, RIGHT: ICD-10-CM

## 2025-04-18 PROCEDURE — 73562 X-RAY EXAM OF KNEE 3: CPT

## 2025-04-18 PROCEDURE — 99024 POSTOP FOLLOW-UP VISIT: CPT | Performed by: STUDENT IN AN ORGANIZED HEALTH CARE EDUCATION/TRAINING PROGRAM

## 2025-04-18 NOTE — PROGRESS NOTES
Date: 25  Tru Sy Jr.   MRN# [id number]  : 1960      Chief Complaint: Post op left total knee arthroplasty    Assessment and Plan:    S/P total knee arthroplasty, left   Patient is 6 weeks s/p left total knee arthroplasty  - WBAT LLE   - Tylenol and Celebrex for pain control prn  - Continue PT/HEP as directed   - May DC ASA for dvt ppx   - May shower and soak/submerge incision  - Begin scar massage   - Compression sock for swelling control prn, suggested he buy one that goes up to his thigh   - Advised use of sunscreen over incision while in prolonged sunlight  - No antibiotic dental prophylaxis is necessary  - No restrictions from our standpoint. Let pain be a guide  - RTC in 6 weeks         Subjective:   Patient is 6 weeks s/p left total knee arthroplasty. Patient is complaining of pain from medial to incision to lateral ankle. Pain when bending, sit to stand. States he feels almost unstable. Attends PT sessions 2x a week. Taking Tylenol PRN for pain    Date of procedure: 3/5/25  Doing well, no new problems  Pain progressively improving  Improved swelling  Ambulating with no assistive device    Allergy:  Allergies   Allergen Reactions    Statins Myalgia    Other Myalgia     Medications:  all current active meds have been reviewed    Past Medical History:  Past Medical History:   Diagnosis Date    Arthritis     Factor V Leiden (HCC)     H/O blood clots     pulmonary embolism    Hypertension     PONV (postoperative nausea and vomiting)     Seasonal allergies      Past Surgical History:  Past Surgical History:   Procedure Laterality Date    FOOT SURGERY Right     KNEE SURGERY Right     [place] ARTHRP KNE CONDYLE&PLATU MEDIAL&LAT COMPARTMENTS Right 1/15/2024    Procedure: ARTHROPLASTY KNEE TOTAL, RIGHT SAMEDAY DISCHARGE;  Surgeon: Rick Wood MD;  Location: [facility] OR;  Service: Orthopedics    [place] ARTHRP KNE CONDYLE&PLATU MEDIAL&LAT COMPARTMENTS Left 3/5/2025    Procedure: ARTHROPLASTY  "KNEE TOTAL, LEFT- SAME DAY;  Surgeon: Rick Wood MD;  Location: [facility];  Service: Orthopedics    SHOULDER SURGERY Bilateral      Family History:  Family History   Problem Relation Age of Onset    No Known Problems Mother     No Known Problems Father      Social History:  Social History     Substance and Sexual Activity   Alcohol Use Not Currently     Social History     Substance and Sexual Activity   Drug Use Never     Social History     Tobacco Use   Smoking Status Former    Current packs/day: 0.00    Average packs/day: 1 pack/day for 16.0 years (16.0 ttl pk-yrs)    Types: Cigarettes    Start date:     Quit date:     Years since quittin.3    Passive exposure: Never   Smokeless Tobacco Never           Review of Systems:  General- denies fever/chills  HEENT- denies hearing loss or sore throat  Eyes- denies eye pain or visual disturbances, denies red eyes  Respiratory- denies cough or SOB  Cardio- denies chest pain or palpitations  GI- denies abdominal pain  Endocrine- denies urinary frequency  Urinary- denies pain with urination  Musculoskeletal- Negative except noted above  Skin- denies rashes or wounds  Neurological- denies dizziness or headache  Psychiatric- denies anxiety or difficulty concentrating      Objective:   BP Readings from Last 1 Encounters:   25 138/65      Wt Readings from Last 1 Encounters:   25 94.3 kg (208 lb)      Pulse Readings from Last 1 Encounters:   25 78        BMI: Estimated body mass index is 36.85 kg/m² as calculated from the following:    Height as of this encounter: 5' 3\" (1.6 m).    Weight as of this encounter: 94.3 kg (208 lb).      Physical Exam  Ht 5' 3\" (1.6 m) Comment: verbal  Wt 94.3 kg (208 lb) Comment: verbal  BMI 36.85 kg/m²   General/Constitutional: No apparent distress: well-nourished and well developed.  Eyes: normal ocular motion  Cardio: RRR, Normal S1S2, No m/r/g.   Lymphatic: No appreciable lymphadenopathy  Respiratory: " Non-labored breathing, CTA b/l no w/c/r  Vascular: No edema, swelling or tenderness, except as noted in detailed exam. Extremities well perfused. No LE edema  Integumentary: No impressive skin lesions present, except as noted in detailed exam.  Neuro: No ataxia or tremors noted  Psych: Normal mood and affect, oriented to person, place and time. Appropriate affect.  Musculoskeletal: Normal, except as noted in detailed exam and in HPI.    Gait and Station:   normal    left Knee Examination  Incision: Well healed  Effusion: None  Alignment: neutral  AP Stability: stable  Coronal Stability  Extension:stable  Mid-flexion: stable  90 degrees flexion: stable  Range of motion  Active: 0 to 115   Extensor lag: Absent  Motor: 5/5 EHL/FHL/TA/GS/QD/HS  Neurovascular exam is intact.   No evidence of calf tightness or posterior cords    Images:  No new imaging    I personally reviewed relevant images in the PACS system and my interpretation is as follows:  X-rays of the left knee reveal a total  knee arthroplasty in appropriate alignment without evidence of migration, wear or loosening.    Scribe Attestation      I,:  Luep Faulkner am acting as a scribe while in the presence of the attending physician.:       I,:  Rick Wood MD personally performed the services described in this documentation    as scribed in my presence.:               Rick Wood MD  Adult Reconstruction Specialist   [facility]

## 2025-04-18 NOTE — ASSESSMENT & PLAN NOTE
Patient is 6 weeks s/p left total knee arthroplasty  - WBAT LLE   - Tylenol and Celebrex for pain control prn  - Continue PT/HEP as directed   - May DC ASA for dvt ppx   - May shower and soak/submerge incision  - Begin scar massage   - Compression sock for swelling control prn, suggested he buy one that goes up to his thigh   - Advised use of sunscreen over incision while in prolonged sunlight  - No antibiotic dental prophylaxis is necessary  - No restrictions from our standpoint. Let pain be a guide  - RTC in 6 weeks

## 2025-04-21 ENCOUNTER — TELEPHONE (OUTPATIENT)
Age: 65
End: 2025-04-21

## 2025-04-21 NOTE — TELEPHONE ENCOUNTER
Caller: Nina Garrett    Doctor: Dr. Wood    Reason for call: Jean Paul is calling for and updated work note for patient dated 4/18. Please fax when note in completed    fax # [phone]    Call back#: [phone]

## 2025-05-30 ENCOUNTER — OFFICE VISIT (OUTPATIENT)
Dept: OBGYN CLINIC | Facility: CLINIC | Age: 65
End: 2025-05-30

## 2025-05-30 VITALS — WEIGHT: 225 LBS | BODY MASS INDEX: 39.87 KG/M2 | HEIGHT: 63 IN

## 2025-05-30 DIAGNOSIS — Z96.652 S/P TOTAL KNEE ARTHROPLASTY, LEFT: Primary | ICD-10-CM

## 2025-05-30 DIAGNOSIS — Z96.651 S/P TOTAL KNEE REPLACEMENT, RIGHT: ICD-10-CM

## 2025-05-30 PROCEDURE — 99024 POSTOP FOLLOW-UP VISIT: CPT | Performed by: STUDENT IN AN ORGANIZED HEALTH CARE EDUCATION/TRAINING PROGRAM

## 2025-05-30 NOTE — LETTER
May 30, 2025     Patient: Tru Sy Jr.  YOB: 1960  Date of Visit: 5/30/2025      To Whom it May Concern:    Tru Sy is under my professional care. Tru was seen in my office on 5/30/2025. Tru is cleared to return without restrictions. We expect to see him one more time for a post operative follow up in ~9 months, 1 year from surgery.     If you have any questions or concerns, please don't hesitate to call.         Sincerely,          Rick Wood MD

## 2025-05-30 NOTE — PROGRESS NOTES
Date: 25  Tru Sy Jr.   MRN# [id number]  : 1960      Chief Complaint: Post op left total knee arthroplasty    Assessment & Plan  S/P total knee arthroplasty, left  Patient is 3 months s/p left total knee arthroplasty  - WBAT LLE   - Tylenol and Celebrex for pain control prn  - Continue PT/HEP as directed  - May shower and soak/submerge incision  - No antibiotic dental prophylaxis is necessary  - No restrictions from our standpoint. Let pain be a guide  - RTC in 9 months. left knee xrays needed        S/P total knee replacement, right  DOS:          Subjective:   Patient is 3 months s/p left total knee arthroplasty    Date of procedure: 3/5/25  Doing well, no new problems  Pain progressively improving  Improved swelling  Ambulating with no assistive device    Allergy:  Allergies[1]  Medications:  all current active meds have been reviewed    Past Medical History:  Past Medical History[2]  Past Surgical History:  Past Surgical History[3]  Family History:  Family History[4]  Social History:  Social History     Substance and Sexual Activity   Alcohol Use Not Currently     Social History     Substance and Sexual Activity   Drug Use Never     Tobacco Use History[5]        Review of Systems:  General- denies fever/chills  HEENT- denies hearing loss or sore throat  Eyes- denies eye pain or visual disturbances, denies red eyes  Respiratory- denies cough or SOB  Cardio- denies chest pain or palpitations  GI- denies abdominal pain  Endocrine- denies urinary frequency  Urinary- denies pain with urination  Musculoskeletal- Negative except noted above  Skin- denies rashes or wounds  Neurological- denies dizziness or headache  Psychiatric- denies anxiety or difficulty concentrating      Objective:   BP Readings from Last 1 Encounters:   25 138/65      Wt Readings from Last 1 Encounters:   25 102 kg (225 lb)      Pulse Readings from Last 1 Encounters:   25 78        BMI:  "Estimated body mass index is 39.86 kg/m² as calculated from the following:    Height as of this encounter: 5' 3\" (1.6 m).    Weight as of this encounter: 102 kg (225 lb).      Physical Exam  Ht 5' 3\" (1.6 m)   Wt 102 kg (225 lb)   BMI 39.86 kg/m²   General/Constitutional: No apparent distress: well-nourished and well developed.  Eyes: normal ocular motion  Cardio: RRR, Normal S1S2, No m/r/g.   Lymphatic: No appreciable lymphadenopathy  Respiratory: Non-labored breathing, CTA b/l no w/c/r  Vascular: No edema, swelling or tenderness, except as noted in detailed exam. Extremities well perfused. No LE edema  Integumentary: No impressive skin lesions present, except as noted in detailed exam.  Neuro: No ataxia or tremors noted  Psych: Normal mood and affect, oriented to person, place and time. Appropriate affect.  Musculoskeletal: Normal, except as noted in detailed exam and in HPI.    Gait and Station:   normal    left Knee Examination  Incision: Well healed  Effusion: None  Alignment: neutral  AP Stability: stable  Coronal Stability  Extension:stable  Mid-flexion: stable  90 degrees flexion: stable  Range of motion  Active: 0 to 115   Extensor lag: Absent  Motor: 5/5 EHL/FHL/TA/GS/QD/HS  Neurovascular exam is intact.   No evidence of calf tightness or posterior cords    Images:  No new imaging          I personally performed the service,     Ralf Hilliard PA-C       [1]   Allergies  Allergen Reactions    Statins Myalgia    Other Myalgia   [2]   Past Medical History:  Diagnosis Date    Arthritis     Factor V Leiden (HCC)     H/O blood clots     pulmonary embolism    Hypertension     PONV (postoperative nausea and vomiting)     Seasonal allergies    [3]   Past Surgical History:  Procedure Laterality Date    FOOT SURGERY Right     KNEE SURGERY Right 2009    [place] ARTHRP KNE CONDYLE&PLATU MEDIAL&LAT COMPARTMENTS Right 1/15/2024    Procedure: ARTHROPLASTY KNEE TOTAL, RIGHT SAMEDAY DISCHARGE;  Surgeon: Rick Wood MD;  " Location: [facility];  Service: Orthopedics    [place] ARTHRP KNE CONDYLE&PLATU MEDIAL&LAT COMPARTMENTS Left 3/5/2025    Procedure: ARTHROPLASTY KNEE TOTAL, LEFT- SAME DAY;  Surgeon: Rick Wood MD;  Location:  [facility] OR;  Service: Orthopedics    SHOULDER SURGERY Bilateral    [4]   Family History  Problem Relation Name Age of Onset    No Known Problems Mother      No Known Problems Father     [5]   Social History  Tobacco Use   Smoking Status Former    Current packs/day: 0.00    Average packs/day: 1 pack/day for 16.0 years (16.0 ttl pk-yrs)    Types: Cigarettes    Start date:     Quit date:     Years since quittin.4    Passive exposure: Never   Smokeless Tobacco Never

## 2025-05-30 NOTE — ASSESSMENT & PLAN NOTE
Patient is 3 months s/p left total knee arthroplasty  - WBAT LLE   - Tylenol and Celebrex for pain control prn  - Continue PT/HEP as directed  - May shower and soak/submerge incision  - No antibiotic dental prophylaxis is necessary  - No restrictions from our standpoint. Let pain be a guide  - RTC in 9 months. left knee xrays needed

## (undated) DEVICE — SCREWS PACK
Type: IMPLANTABLE DEVICE | Site: KNEE | Status: NON-FUNCTIONAL
Brand: KNEE INSTRUMENTS
Removed: 2025-03-05

## (undated) DEVICE — SPHERE FEMORAL S2 INSTRUMENT SET: Brand: KNEE INSTRUMENTS

## (undated) DEVICE — DRESSING MEPILEX AG BORDER POST-OP 4 X 12 IN

## (undated) DEVICE — SPHERE INSERT INSTRUMENT SET  I3L-T3/4: Brand: KNEE INSTRUMENTS

## (undated) DEVICE — 10FR FRAZIER SUCTION HANDLE: Brand: CARDINAL HEALTH

## (undated) DEVICE — SUT VICRYL 2-0 CT-1 36 IN J945H

## (undated) DEVICE — BETHLEHEM UNIV TOTAL KNEE, KIT: Brand: CARDINAL HEALTH

## (undated) DEVICE — SURGICAL GOWN, XL SMARTSLEEVE: Brand: CONVERTORS

## (undated) DEVICE — SAW BLADE RECIPROCATING 179

## (undated) DEVICE — SUT STRATAFIX SPIRAL PDS PLUS 1 CTX 18 IN SXPP1A400

## (undated) DEVICE — SWORD PIN PACK
Type: IMPLANTABLE DEVICE | Site: KNEE | Status: NON-FUNCTIONAL
Brand: KNEE INSTRUMENTS
Removed: 2025-03-05

## (undated) DEVICE — HANDPIECE SET WITH RETRACTABLE COAXIAL FAN SPRAY TIP AND SUCTION TUBE: Brand: INTERPULSE

## (undated) DEVICE — SUT STRATAFIX SPIRAL PLUS 3-0 PS-2 30 X 30 CM SXMP2B408

## (undated) DEVICE — CONVENTIONAL KA INSTRUMENT SET: Brand: KNEE INSTRUMENTS

## (undated) DEVICE — ACE WRAP 6 IN UNSTERILE

## (undated) DEVICE — 4-PORT MANIFOLD: Brand: NEPTUNE 2

## (undated) DEVICE — GLOVE INDICATOR PI UNDERGLOVE SZ 8 BLUE

## (undated) DEVICE — COBAN 6 IN STERILE

## (undated) DEVICE — VEST SURGEON DISPOSABLE

## (undated) DEVICE — CORRECTION GUIDE INSTRUMENT SET: Brand: KNEE INSTRUMENTS

## (undated) DEVICE — HOOD WITH PEEL AWAY FACE SHIELD: Brand: T7PLUS

## (undated) DEVICE — DRILLS AND REAMERS SPHERE INSTRUMENT SET: Brand: KNEE INSTRUMENTS

## (undated) DEVICE — SUT VICRYL PLUS 1 CTB-1 36 IN VCPB947H

## (undated) DEVICE — PADDING CAST 6IN COTTON STRL

## (undated) DEVICE — IMPACTOR HANDLE: Brand: KNEE INSTRUMENTS

## (undated) DEVICE — IMPERVIOUS STOCKINETTE: Brand: DEROYAL

## (undated) DEVICE — GENERAL MIS SPHERE INSTRUMENT SET: Brand: KNEE INSTRUMENTS

## (undated) DEVICE — CEMENT MIXING SYSTEM WITH FEMORAL BREAKWAY NOZZLE: Brand: REVOLUTION

## (undated) DEVICE — GAUZE SPONGES,16 PLY: Brand: CURITY

## (undated) DEVICE — SPHERE FEMORAL S3 INSTRUMENT SET: Brand: KNEE INSTRUMENTS

## (undated) DEVICE — GLOVE SRG BIOGEL 7.5

## (undated) DEVICE — SUT MONOCRYL 3-0 PS-2 27 IN Y427H

## (undated) DEVICE — INTENDED FOR TISSUE SEPARATION, AND OTHER PROCEDURES THAT REQUIRE A SHARP SURGICAL BLADE TO PUNCTURE OR CUT.: Brand: BARD-PARKER ® CARBON RIB-BACK BLADES

## (undated) DEVICE — PREP SURGICAL PURPREP 26ML

## (undated) DEVICE — NEEDLE SPINAL18G X 3.5 IN QUINCKE

## (undated) DEVICE — SYRINGE 30ML LL

## (undated) DEVICE — ADHESIVE SKIN CLOSURE SYS EXOFIN FUSION 22CM

## (undated) DEVICE — SAW BLADE OSCILLATING BRAZOL 167

## (undated) DEVICE — SHORT THREADED PINS PACK: Brand: KNEE INSTRUMENTS

## (undated) DEVICE — WEBRIL 6 IN UNSTERILE

## (undated) DEVICE — CUFF TOURNIQUET 30 X 4 IN QUICK CONNECT DISP 1BLA

## (undated) DEVICE — SUT VICRYL 2 TP-1 27 IN J849G

## (undated) DEVICE — SCD SEQUENTIAL COMPRESSION COMFORT SLEEVE MEDIUM KNEE LENGTH: Brand: KENDALL SCD

## (undated) DEVICE — 450 ML BOTTLE OF 0.05% CHLORHEXIDINE GLUCONATE IN 99.95% STERILE WATER FOR IRRIGATION, USP AND APPLICATOR.: Brand: IRRISEPT ANTIMICROBIAL WOUND LAVAGE

## (undated) DEVICE — THREADED PINS PACK: Brand: KNEE INSTRUMENTS

## (undated) DEVICE — HOOD: Brand: T7PLUS

## (undated) DEVICE — DECANTER: Brand: UNBRANDED

## (undated) DEVICE — PLUMEPEN PRO 10FT

## (undated) DEVICE — HOOD: Brand: FLYTE, SURGICOOL

## (undated) DEVICE — SPHERE INSERT INSTRUMENT SET  I3R-T3/4: Brand: KNEE INSTRUMENTS

## (undated) DEVICE — PENCIL ELECTROSURG E-Z CLEAN -0035H

## (undated) DEVICE — SPECIMEN CONTAINER STERILE PEEL PACK

## (undated) DEVICE — GROUNDING PAD RFL

## (undated) DEVICE — DRAPE SHEET THREE QUARTER

## (undated) DEVICE — DRAPE EQUIPMENT RF WAND

## (undated) DEVICE — SPONGE LAP 18 X 18 IN STRL RFD

## (undated) DEVICE — ASTOUND STANDARD SURGICAL GOWN, XL: Brand: CONVERTORS